# Patient Record
Sex: FEMALE | Race: WHITE | Employment: FULL TIME | URBAN - METROPOLITAN AREA
[De-identification: names, ages, dates, MRNs, and addresses within clinical notes are randomized per-mention and may not be internally consistent; named-entity substitution may affect disease eponyms.]

---

## 2017-10-30 ENCOUNTER — OFFICE VISIT (OUTPATIENT)
Dept: INTERNAL MEDICINE CLINIC | Age: 32
End: 2017-10-30

## 2017-10-30 VITALS
RESPIRATION RATE: 16 BRPM | TEMPERATURE: 97.8 F | WEIGHT: 131 LBS | SYSTOLIC BLOOD PRESSURE: 114 MMHG | HEART RATE: 62 BPM | OXYGEN SATURATION: 97 % | BODY MASS INDEX: 22.36 KG/M2 | DIASTOLIC BLOOD PRESSURE: 78 MMHG | HEIGHT: 64 IN

## 2017-10-30 DIAGNOSIS — Z00.00 ANNUAL PHYSICAL EXAM: Primary | ICD-10-CM

## 2017-10-30 DIAGNOSIS — D24.9 FIBROADENOMA OF BREAST DETERMINED BY BIOPSY: ICD-10-CM

## 2017-10-30 DIAGNOSIS — N63.13 BREAST LUMP ON RIGHT SIDE AT 8 O'CLOCK POSITION: ICD-10-CM

## 2017-10-30 DIAGNOSIS — H93.8X2 SENSATION OF FULLNESS IN LEFT EAR: ICD-10-CM

## 2017-10-30 NOTE — PROGRESS NOTES
Ms. Pili Badillo is a 27y.o. year old female who had concerns including Well Woman and Ear Pain. HPI:  Chief Complaint   Patient presents with    Well Woman     patient has had pap within the past 2 years, patient complains of lump in breast     Ear Pain     popping of the ear on and off for a month     Seen by fertility specialist Dr Laurie Ocampo- labs done and normal.   Her  is a patient here. Pap done at [facility]. History reviewed. No pertinent past medical history. No current outpatient prescriptions on file. No current facility-administered medications for this visit. Reviewed PmHx, RxHx, FmHx, SocHx, AllgHx and updated and dated in the chart. ROS: Negative except for BOLD  General: fever, chills, fatigue  Respiratory: cough, SOB, wheezing  Cardiovascular:  CP, palpitation, LOO, edema   Gastrointestinal: N/V/D, bleeding  Genito-Urinary: dysuria, hematuria  Musculoskeletal: muscle weakness, pain, swelling    OBJECTIVE:   Visit Vitals    /78 (BP 1 Location: Right arm, BP Patient Position: Sitting)    Pulse 62    Temp 97.8 °F (36.6 °C) (Oral)    Resp 16    Ht 5' 4\" (1.626 m)    Wt 131 lb (59.4 kg)    LMP 10/23/2017 (Approximate)    SpO2 97%    BMI 22.49 kg/m2     GEN: The patient appears well, alert, oriented x 3, in no distress. ENT: bilateral TM and canal normal.  Neck supple. No adenopathy or thyromegaly. NICOLAS. Lungs: clear bilaterally, good air entry, no wheezes, rhonchi or rales. Cardiovascular: regular rate and rhythm. S1 and S2 normal, no murmurs,  Abdomen: + BS, soft without tenderness, guarding, rebound, mass or organomegaly. Extremities: no edema, normal peripheral pulses. Neurological: normal, gross sensory and motor in tact without focal findings. Breasts: left breast normal without mass, skin or nipple changes or axillary nodes, right abnormal mass palpable . right breast lump about 8 oclock , fibrous, about 2cm        Assessment/ Plan:       ICD-10-CM ICD-9-CM    1. Breast lump on right side at 8 o'clock position N63.13 611.72 [facility] MAMMOGRAM DIAG RIGHT SAME DAY INCL CAD      US BREASTS LIMITED=<3 QUAD   2. 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 Sensation of fullness in left ear H93.8X2 388. 8      Sinus nasal pressure, no OM or OE. Sx care.   h/d    I have discussed the diagnosis with the patient and the intended plan as seen in the above orders. The patient has received an after-visit summary and questions were answered concerning future plans. Medication Side Effects and Warnings were discussed with patient. Follow-up Disposition:  Return for review mammogram and ultrasound.       Ilana De La Garza MD

## 2017-10-30 NOTE — PROGRESS NOTES
Chief Complaint   Patient presents with    Well Woman     patient has had pap within the past 2 years, patient complains of lump in breast     Ear Pain     popping of the ear on and off for a month     1. Have you been to the ER, urgent care clinic since your last visit? Hospitalized since your last visit? No    2. Have you seen or consulted any other health care providers outside of the [address] [place] [address] since your last visit? Include any pap smears or colon screening.  No

## 2017-10-30 NOTE — MR AVS SNAPSHOT
Visit Information Date & Time Provider Department Dept. Phone Encounter #  
 10/30/2017  9:15 AM MD [facility] [address] [place] [address] [phone] [id number] Upcoming Health Maintenance Date Due  
 PAP AKA CERVICAL CYTOLOGY 8/10/2004 DTaP/Tdap/Td series (2 - Td) 10/30/2027 Allergies as of 10/30/2017  Review Complete On: 10/30/2017 By: Tory Scott Not on File Current Immunizations  Never Reviewed No immunizations on file. Not reviewed this visit You Were Diagnosed With   
  
 Codes Comments Breast lump on right side at 8 o'clock position    -  Primary ICD-10-CM: N63.13 ICD-9-CM: 611.72 Fibroadenoma of breast determined by biopsy     ICD-10-CM: D24.9 ICD-9-CM: 840 Vitals BP Pulse Temp Resp Height(growth percentile) Weight(growth percentile) 114/78 (BP 1 Location: Right arm, BP Patient Position: Sitting) 62 97.8 °F (36.6 °C) (Oral) 16 5' 4\" (1.626 m) 131 lb (59.4 kg) LMP SpO2 BMI OB Status Smoking Status 10/23/2017 (Approximate) 97% 22.49 kg/m2 Having regular periods Current Some Day Smoker BMI and BSA Data Body Mass Index Body Surface Area  
 22.49 kg/m 2 1.64 m 2 Preferred Pharmacy Pharmacy Name Phone [facility] [address] [place] [address] [phone] Your Updated Medication List  
  
Notice  As of 10/30/2017 10:12 AM  
 You have not been prescribed any medications. To-Do List   
 10/30/2017 Imaging:  [facility] MAMMOGRAM DIAG RIGHT SAME DAY INCL CAD Introducing [facility] & HEALTH SERVICES! Lavern Quezada introduces [employer] patient portal. Now you can access parts of your medical record, email your doctor's office, and request medication refills online. 1. In your internet browser, go to [email or web address] 2. Click on the First Time User? Click Here link in the Sign In box. You will see the New Member Sign Up page. 3. Enter your [employer] Access Code exactly as it appears below. You will not need to use this code after youve completed the sign-up process. If you do not sign up before the expiration date, you must request a new code. · [employer] Access Code: [email or web address] Expires: 1/28/2018 10:12 AM 
 
4. Enter the last four digits of your Social Security Number (xxxx) and Date of Birth (mm/dd/yyyy) as indicated and click Submit. You will be taken to the next sign-up page. 5. Create a [employer] ID. This will be your [employer] login ID and cannot be changed, so think of one that is secure and easy to remember. 6. Create a [employer] password. You can change your password at any time. 7. Enter your Password Reset Question and Answer. This can be used at a later time if you forget your password. 8. Enter your e-mail address. You will receive e-mail notification when new information is available in [address] 9. Click Sign Up. You can now view and download portions of your medical record. 10. Click the Download Summary menu link to download a portable copy of your medical information. If you have questions, please visit the Frequently Asked Questions section of the [employer] website. Remember, [employer] is NOT to be used for urgent needs. For medical emergencies, dial 911. Now available from your iPhone and Android! Please provide this summary of care documentation to your next provider. If you have any questions after today's visit, please call [phone].

## 2017-12-18 ENCOUNTER — HOSPITAL ENCOUNTER (OUTPATIENT)
Dept: MAMMOGRAPHY | Age: 32
Discharge: HOME | End: 2017-12-18
Attending: OBSTETRICS & GYNECOLOGY

## 2017-12-18 DIAGNOSIS — N63.10 LUMP OF BREAST, RIGHT: ICD-10-CM

## 2018-08-30 ENCOUNTER — OFFICE VISIT (OUTPATIENT)
Dept: INTERNAL MEDICINE CLINIC | Age: 33
End: 2018-08-30

## 2018-08-30 VITALS
BODY MASS INDEX: 21.89 KG/M2 | OXYGEN SATURATION: 97 % | HEART RATE: 64 BPM | WEIGHT: 128.2 LBS | TEMPERATURE: 98.7 F | RESPIRATION RATE: 16 BRPM | SYSTOLIC BLOOD PRESSURE: 114 MMHG | DIASTOLIC BLOOD PRESSURE: 78 MMHG | HEIGHT: 64 IN

## 2018-08-30 DIAGNOSIS — B00.1 COLD SORE: ICD-10-CM

## 2018-08-30 DIAGNOSIS — K12.0 APHTHOUS ULCER: Primary | ICD-10-CM

## 2018-08-30 RX ORDER — TRIAMCINOLONE ACETONIDE 1 MG/G
PASTE DENTAL 2 TIMES DAILY
Qty: 5 G | Refills: 0 | Status: SHIPPED | OUTPATIENT
Start: 2018-08-30 | End: 2019-02-11

## 2018-08-30 NOTE — PROGRESS NOTES
No chief complaint on file. she is a 26y.o. year old female who presents for evaluation of sores in mouth for 1 week. Patient states that she has no history of cold sores. No known medical problems or similar issues. Denies any fever nausea vomiting ingestion or problems with swallowing. States that she has 2 sores in her mouth and is concerned that this may spread to more Reviewed and agree with Nurse Note and duplicated in this note. Reviewed PmHx, RxHx, FmHx, SocHx, AllgHx and updated and dated in the chart. Family History Problem Relation Age of Onset  Huntingtons disease Mother History reviewed. No pertinent past medical history. Social History Social History  Marital status:  Spouse name: N/A  
 Number of children: N/A  
 Years of education: N/A Social History Main Topics  Smoking status: Current Some Day Smoker  Smokeless tobacco: Never Used  Alcohol use Yes Comment: socially  Drug use: Yes Special: Marijuana Comment: socially  Sexual activity: Yes  
  Partners: Male Birth control/ protection: None Other Topics Concern  None Social History Narrative Review of Systems - negative except as listed above Objective:  
 
Vitals:  
 08/30/18 1440 BP: 114/78 Pulse: 64 Resp: 16 Temp: 98.7 °F (37.1 °C) TempSrc: Oral  
SpO2: 97% Weight: 128 lb 3.2 oz (58.2 kg) Height: 5' 4\" (1.626 m) Physical Examination: General appearance - alert, well appearing, and in no distress Eyes - pupils equal and reactive, extraocular eye movements intact Ears - bilateral TM's and external ear canals normal 
Nose - normal and patent, no erythema, discharge or polyps Mouth - mucous membranes moist, pharynx normal without lesions and oral lesion noted ×1 in the posterior pharynx and one on upper lip Neck - supple, no significant adenopathy Chest - clear to auscultation, no wheezes, rales or rhonchi, symmetric air entry Heart - normal rate, regular rhythm, normal S1, S2, no murmurs, rubs, clicks or gallops Extremities - peripheral pulses normal, no pedal edema, no clubbing or cyanosis Skin - normal coloration and turgor, no rashes, no suspicious skin lesions noted Assessment/ Plan:  
Diagnoses and all orders for this visit: 1. Aphthous ulcer 2. Cold sore 
-     HSV 1 AND 2-SPEC AB, IGG W/RFX TO SUPPL HSV-2 TESTING Other orders 
-     triamcinolone acetonide (KENALOG) 0.1 % dental paste; by Dental route two (2) times a day. Follow-up Disposition: Not on File Patient was informed/counseled on: Body mass index is 22.01 kg/(m^2). 1) Remember to stay active and/or exercise regularly (I suggest 30-45 minutes daily) 2) For reliable dietary information, go to www. EATRIGHT.org. You may wish to consider seeing the nutritionist at [facility] [phone], also consider the [id number] [place] [address]. 3) I routinely suggest a complete physical exam once each year (your birth month) I have discussed the diagnosis with the patient and the intended plan as seen in the above orders. The patient has received an after-visit summary and questions were answered concerning future plans. Medication Side Effects and Warnings were discussed with patient: yes Patient Labs were reviewed and or requested: yes Patient Past Records were reviewed and or requested  yes I have discussed the diagnosis with the patient and the intended plan as seen in the above orders. Pt agrees to call or return to clinic and/or go to closest ER with any worsening of symptoms. This may include, but not limited to increased fever (>100.4) with NSAIDS or Tylenol, increased edema, confusion, rash, worsening of presenting symptoms.

## 2018-08-30 NOTE — PROGRESS NOTES
No chief complaint on file. 26 y.o. female with sore throat, myalgias, swollen glands, headache and fever for 7 days. No history of rheumatic fever. Other symptoms: sore throat, swollen glands and white spots in throat. Fever -no Exudative pharyngitis-no Lymphadenopathy-yes Absence of cough-no Reviewed and agree with Nurse Note and duplicated in this note. Reviewed PmHx, RxHx, FmHx, SocHx, AllgHx and updated and dated in the chart. Family History Problem Relation Age of Onset  Huntingtons disease Mother History reviewed. No pertinent past medical history. Social History Social History  Marital status:  Spouse name: N/A  
 Number of children: N/A  
 Years of education: N/A Social History Main Topics  Smoking status: Current Some Day Smoker  Smokeless tobacco: Never Used  Alcohol use Yes Comment: socially  Drug use: Yes Special: Marijuana Comment: socially  Sexual activity: Yes  
  Partners: Male Birth control/ protection: None Other Topics Concern  None Social History Narrative Review of Systems - negative except as listed above 
{ros master:917510} Objective:  
 
Vitals:  
 08/30/18 1440 Weight: 128 lb 3.2 oz (58.2 kg) Height: 5' 4\" (1.626 m) Physical Examination: {male adult master:459016} Assessment/ Plan:  
{There are no diagnoses linked to this encounter. (Refresh or delete this SmartLink)} Follow-up Disposition: Not on File Discussed the patient's {MU BMI REASON:687588075} BMI with her. The BMI follow up plan is as follows: {Document your plan here:64508} 1) Remember to stay active and/or exercise regularly (I suggest 30-45 minutes daily) 2) For reliable dietary information, go to www. EATRIGHT.org. You may wish to consider seeing the nutritionist at [facility] [phone], also consider the [address] [facility]. 3) I routinely suggest a complete physical exam once each year (your birth month) I have discussed the diagnosis with the patient and the intended plan as seen in the above orders. The patient has received an after-visit summary and questions were answered concerning future plans. Medication Side Effects and Warnings were discussed with patient: {yes/ no default yes:19425::\"yes\"} Patient Labs were reviewed and or requested: {yes/ no default yes:49823::\"yes\"} Patient Past Records were reviewed and or requested  {yes/ no default yes:67374::\"yes\"} I have discussed the diagnosis with the patient and the intended plan as seen in the above orders. Pt agrees to call or return to clinic and/or go to closest ER with any worsening of symptoms. This may include, but not limited to increased fever (>100.4) with NSAIDS or Tylenol, increased edema, confusion, rash, worsening of presenting symptoms.

## 2018-08-30 NOTE — MR AVS SNAPSHOT
[address] [facility]. [place] [address] [address] 
[phone] Patient: aLmonte Leblanc MRN: [id number] PLZ:[id number] Visit Information Date & Time Provider Department Dept. Phone Encounter #  
 8/30/2018  2:45 PM Veronica Bell MD [facility] [phone] [id number] Follow-up Instructions Return if symptoms worsen or fail to improve. Follow-up and Disposition History Upcoming Health Maintenance Date Due Influenza Age 5 to Adult 11/30/2018* Pneumococcal 19-64 Medium Risk (1 of 1 - PPSV23) 8/30/2019* PAP AKA CERVICAL CYTOLOGY 4/3/2021 DTaP/Tdap/Td series (2 - Td) 10/30/2027 *Topic was postponed. The date shown is not the original due date. Allergies as of 8/30/2018  Review Complete On: 8/30/2018 By: Alisha Mendoza Not on File Current Immunizations  Never Reviewed No immunizations on file. Not reviewed this visit You Were Diagnosed With   
  
 Codes Comments Aphthous ulcer    -  Primary ICD-10-CM: K12.0 ICD-9-CM: 528.2 Cold sore     ICD-10-CM: B00.1 ICD-9-CM: 054.9 Vitals BP Pulse Temp Resp Height(growth percentile) Weight(growth percentile) 114/78 (BP 1 Location: Right arm, BP Patient Position: Sitting) 64 98.7 °F (37.1 °C) (Oral) 16 5' 4\" (1.626 m) 128 lb 3.2 oz (58.2 kg) LMP SpO2 BMI OB Status Smoking Status 08/22/2018 (Approximate) 97% 22.01 kg/m2 Having regular periods Current Some Day Smoker Vitals History BMI and BSA Data Body Mass Index Body Surface Area 22.01 kg/m 2 1.62 m 2 [facility] [address] [phone] Your Updated Medication List  
  
   
This list is accurate as of 8/30/18  3:21 PM.  Always use your most recent med list.  
  
  
  
  
 triamcinolone acetonide 0.1 % dental paste Commonly known as:  KENALOG by Dental route two (2) times a day. Prescriptions Sent to Pharmacy Refills  
 triamcinolone acetonide (KENALOG) 0.1 % dental paste 0 Sig: by Dental route two (2) times a day. Class: Normal  
 Pharmacy: [address] [place] [address], [facility]  #: [phone] Route: Dental  
  
We Performed the Following HSV 1 AND 2-SPEC AB, IGG W/RFX TO SUPPL HSV-2 TESTING [[id number] Custom] Follow-up Instructions Return if symptoms worsen or fail to improve. Introducing [facility] & HEALTH SERVICES! [facility] introduces [employer] patient portal. Now you can access parts of your medical record, email your doctor's office, and request medication refills online. 1. In your internet browser, go to [email or web address] 2. Click on the First Time User? Click Here link in the Sign In box. You will see the New Member Sign Up page. 3. Enter your [employer] Access Code exactly as it appears below. You will not need to use this code after youve completed the sign-up process. If you do not sign up before the expiration date, you must request a new code. · [employer] Access Code: [email or web address] Expires: 11/28/2018  3:21 PM 
 
4. Enter the last four digits of your Social Security Number (xxxx) and Date of Birth (mm/dd/yyyy) as indicated and click Submit. You will be taken to the next sign-up page. 5. Create a [employer] ID. This will be your [employer] login ID and cannot be changed, so think of one that is secure and easy to remember. 6. Create a [employer] password. You can change your password at any time. 7. Enter your Password Reset Question and Answer. This can be used at a later time if you forget your password. 8. Enter your e-mail address. You will receive e-mail notification when new information is available in [address] 9. Click Sign Up. You can now view and download portions of your medical record. 10. Click the Download Summary menu link to download a portable copy of your medical information. If you have questions, please visit the Frequently Asked Questions section of the [employer] website. Remember, [employer] is NOT to be used for urgent needs. For medical emergencies, dial 911. Now available from your iPhone and Android! Please provide this summary of care documentation to your next provider. Your primary care clinician is listed as Kerry Carr. If you have any questions after today's visit, please call [phone].

## 2018-08-31 LAB
HSV1 IGG SER IA-ACNC: <0.91 INDEX (ref 0–0.9)
HSV2 IGG SER IA-ACNC: <0.91 INDEX (ref 0–0.9)

## 2018-08-31 NOTE — PROGRESS NOTES
Good news, you blood work came back negative for cold sores. Looks like an aphthous ulcer, continue with the paste that was prescribed.

## 2019-01-03 ENCOUNTER — HOSPITAL ENCOUNTER (OUTPATIENT)
Dept: MAMMOGRAPHY | Age: 34
Discharge: HOME | End: 2019-01-03
Attending: OBSTETRICS & GYNECOLOGY
Payer: COMMERCIAL

## 2019-01-03 DIAGNOSIS — N63.10 LUMP OF RIGHT BREAST: ICD-10-CM

## 2019-01-03 PROCEDURE — 77066 DX MAMMO INCL CAD BI: CPT

## 2019-01-03 PROCEDURE — 76642 ULTRASOUND BREAST LIMITED: CPT

## 2019-01-03 NOTE — PROGRESS NOTES
I left a vm for Dr. Fidel Weiss Ards nurse and let her know that we recommended an aspiration, possible biopsy of right breast. I asked for her to call us back and requested an order.

## 2019-01-07 ENCOUNTER — HOSPITAL ENCOUNTER (OUTPATIENT)
Dept: MAMMOGRAPHY | Age: 34
Discharge: HOME | End: 2019-01-07
Attending: OBSTETRICS & GYNECOLOGY
Payer: COMMERCIAL

## 2019-01-07 DIAGNOSIS — R92.8 ABNORMAL MAMMOGRAM OF RIGHT BREAST: ICD-10-CM

## 2019-01-07 PROCEDURE — 19083 BX BREAST 1ST LESION US IMAG: CPT

## 2019-01-07 PROCEDURE — 74011000250 HC RX REV CODE- 250: Performed by: RADIOLOGY

## 2019-01-07 PROCEDURE — 74011250636 HC RX REV CODE- 250/636: Performed by: RADIOLOGY

## 2019-01-07 RX ORDER — LIDOCAINE HYDROCHLORIDE 10 MG/ML
5 INJECTION INFILTRATION; PERINEURAL
Status: COMPLETED | OUTPATIENT
Start: 2019-01-07 | End: 2019-01-07

## 2019-01-07 RX ORDER — LIDOCAINE HYDROCHLORIDE AND EPINEPHRINE 10; 10 MG/ML; UG/ML
20 INJECTION, SOLUTION INFILTRATION; PERINEURAL ONCE
Status: COMPLETED | OUTPATIENT
Start: 2019-01-07 | End: 2019-01-07

## 2019-01-07 RX ADMIN — LIDOCAINE HYDROCHLORIDE AND EPINEPHRINE 200 MG: 10; 10 INJECTION, SOLUTION INFILTRATION; PERINEURAL at 08:10

## 2019-01-07 RX ADMIN — LIDOCAINE HYDROCHLORIDE 5 ML: 10 INJECTION, SOLUTION INFILTRATION; PERINEURAL at 08:10

## 2019-01-07 NOTE — PROGRESS NOTES
Patient tolerated right breast biopsy (with partial cyst aspiration) well with minimal bleeding. Biopsy site was bandaged in the usual fashion and discharge instructions were reviewed with the patient. She was provided with a written copy as well. Advised patient that results should be available by Wednesday and that she will receive a phone call in the afternoon. Encouraged her to call with any questions or concerns.

## 2019-01-07 NOTE — DISCHARGE INSTRUCTIONS
Breast Biopsy Discharge Instructions    PAIN CONTROL     You may have mild discomfort; take 1-2 Tylenol every 4-6 hours as needed.  Do not take aspirin containing products or anti-inflammatory medications (Advil, Aleve, Motrin, Ibuprofen, etc.) for 24 hours.  Wearing a comfortable bra for support may help with discomfort. WOUND CARE      A small amount of bleeding or bruising at the biopsy site is normal. Watch for signs and symptoms of infections: skin warm to touch, yellowish drainage from wound, fever or severe pain.  Place an ice pack over the site hourly, 20-30 at a time for a few hours today.  The clear dressing is water resistant (you may shower, but do not allow the dressing to become saturated). You may remove the dressing in 48 hours. The steri-strips (small pieces of tape covering the incision) will fall off on their own in a few days. If the clear dressing irritates your skin or begins to peel off, you may remove it. Remember, if you remove the clear dressing before 48 hours, you should not get the site wet.  If at any point you have EXCESSIVE BLEEDING (saturating the gauze under the clear dressing) OR pain please call:    Daytime 7:30am-5:00pm: [facility] [phone]    After Hours:    [phone] (ask to speak to a radiologist)              or [address] [place] [address] [phone]    ACTIVITY     Do not participate in any strenuous activities for 24 hours (exercise, sports, housework, etc.).  You may resume work (light duty only) for the first 24 hours.  No heavy lifting with the arm on the affected side of your body. ADDITIONAL INSTRUCTIONS    We will call you with results on Wednesday January 9 in the afternoon.        YOUR TREATMENT TEAM TODAY WAS    MD: Ziggy Leon  RN: Lawson Choi  Radiology Tech: Luann Escobar

## 2019-01-09 NOTE — PROGRESS NOTES
Pathology approved by Dr. Paco Mcfarlane. Called patient and relayed benign results. Advised patient that she should begin annual mammograms beginning at age 34. Advised her that if she should find another area of concern she should call us for an appointment. She reports that her biopsy site is healing well with no concerns. Encouraged her to call with any questions or concerns.

## 2019-02-11 ENCOUNTER — OFFICE VISIT (OUTPATIENT)
Dept: SURGERY | Age: 34
End: 2019-02-11

## 2019-02-11 VITALS
BODY MASS INDEX: 24.07 KG/M2 | DIASTOLIC BLOOD PRESSURE: 84 MMHG | SYSTOLIC BLOOD PRESSURE: 132 MMHG | HEIGHT: 64 IN | HEART RATE: 71 BPM | WEIGHT: 141 LBS

## 2019-02-11 DIAGNOSIS — N60.01 BREAST CYST, RIGHT: Primary | ICD-10-CM

## 2019-02-11 RX ORDER — AMOXICILLIN AND CLAVULANATE POTASSIUM 875; 125 MG/1; MG/1
1 TABLET, FILM COATED ORAL 2 TIMES DAILY
Qty: 20 TAB | Refills: 0 | Status: SHIPPED | OUTPATIENT
Start: 2019-02-11 | End: 2019-02-21

## 2019-02-11 NOTE — PATIENT INSTRUCTIONS

## 2019-02-11 NOTE — PROGRESS NOTES
HISTORY OF PRESENT ILLNESS  Azul Hernandez is a 26 y.o. female. HPI   NEW patient here today referred for consultation at the request of Dr. Cristopher Staples for swelling, redness and pain at biopsy site. She palpated lump in LEFT breast, which led to mammogram and biopsy. Biopsy was performed on 1/7/19. She has been having no problems at site until this past weekend. Over the weekend, she developed swelling, redness, and pain at biopsy site. Pain has improved and rates pain at 2/10 at this time. She is still able to palpate original lump. FINAL PATHOLOGIC DIAGNOSIS on 1/7/19-    Breast, right, biopsy:   Features consistent with benign cyst.   Apocrine metaplasia. No FH of breast or ovarian cancer. Recent imaging-   Bilateral diagnostic mammogram on 1/3/19- BIRADS 4a  FINDINGS: Bilateral digital diagnostic mammography was performed, and is  interpreted in conjunction with a computer assisted detection (CAD) system. Spot  compression views were performed of the region of palpable concern, the middle  third of the right breast at 8:00. There is an oval obscured equal density mass  in the region of palpable concern. There are no suspicious findings within the  remainder of either breast. Bilateral benign calcifications are noted. There is  no skin thickening or nipple retraction.     Right breast ultrasound was performed of the region of palpable concern, the  8:00 location, 4 cm from the nipple. There is a 3 cm oval circumscribed  homogeneously hypoechoic mass with no internal vascularity.      IMPRESSION:     1. BI-RADS Assessment Category 4a: Suspicious abnormality - Biopsy should be  performed in the absence of clinical contraindication. Palpable 3 cm right  breast mass, representing either a solid mass (such as fibroadenoma) or cyst  with internal echoes. Ultrasound-guided cyst aspiration, followed by core needle  biopsy is necessary, is recommended.  This will be particularly helpful given  patient's planned pregnancy, when the mass may further increase in size. 2. No mammographic evidence of malignancy within the left breast.    History reviewed. No pertinent past medical history. History reviewed. No pertinent surgical history. Social History     Socioeconomic History    Marital status:      Spouse name: Not on file    Number of children: Not on file    Years of education: Not on file    Highest education level: Not on file   Social Needs    Financial resource strain: Not on file    Food insecurity - worry: Not on file    Food insecurity - inability: Not on file    Transportation needs - medical: Not on file   u[employer] needs - non-medical: Not on file   Occupational History    Not on file   Tobacco Use    Smoking status: Former Smoker     Last attempt to quit: 2016     Years since quitting: 3.1    Smokeless tobacco: Never Used   Substance and Sexual Activity    Alcohol use: Yes     Comment: socially    Drug use: Yes     Types: Marijuana     Comment: socially     Sexual activity: Yes     Partners: Male     Birth control/protection: None   Other Topics Concern    Not on file   Social History Narrative    Not on file     OB History     Obstetric Comments    Menarche 15, LMP current, # of children 0, age of 1st delivery na, Hysterectomy/oophorectomy no/no, Breast bx yes, right 1/7/19, history of breast feeding na, BCP yes, Hormone therapy yes, for IVF            Current Outpatient Medications:     amoxicillin-clavulanate (AUGMENTIN) 875-125 mg per tablet, Take 1 Tab by mouth two (2) times a day for 10 days. , Disp: 20 Tab, Rfl: 0  No Known Allergies    Review of Systems   Constitutional: Negative. HENT: Negative. Eyes: Negative. Respiratory: Negative. Cardiovascular: Negative. Gastrointestinal: Negative. Genitourinary: Negative. Musculoskeletal: Negative. Skin: Negative. Neurological: Negative. Endo/Heme/Allergies: Negative.     Psychiatric/Behavioral: Negative. All other systems reviewed and are negative. Physical Exam   Constitutional: She is oriented to person, place, and time. She appears well-developed and well-nourished. HENT:   Head: Normocephalic. Eyes: EOM are normal.   Neck: Neck supple. Cardiovascular: Intact distal pulses. Pulmonary/Chest: Effort normal. Right breast exhibits mass (2 x 2 cm mass 8:00; area of skin erythema mild over cyst). Right breast exhibits no inverted nipple, no nipple discharge, no skin change and no tenderness. Left breast exhibits no inverted nipple, no mass, no nipple discharge, no skin change and no tenderness. Breasts are symmetrical.       Abdominal: Soft. Musculoskeletal: Normal range of motion. Lymphadenopathy:     She has no cervical adenopathy. She has no axillary adenopathy. Neurological: She is alert and oriented to person, place, and time. Skin: Skin is warm and dry. Psychiatric: She has a normal mood and affect. Nursing note and vitals reviewed. ASSESSMENT and PLAN    ICD-10-CM ICD-9-CM    1. Breast cyst, right N60.01 610.0      - inflamed right breast cyst, start abx   F/u in 2 weeks.

## 2019-02-11 NOTE — PROGRESS NOTES
Patient called today and reported that starting approximately last Thursday (2/7/19) her biopsy site had become tender and had an area of redness approximately 2 inches in diameter. Patient also states that the area is slightly warm to the touch. Discussed patients complaints with Dr. Yumiko Johnson. His recommendation is that she consult with [facility] today if possible. Spoke with Freddy Luque at [facility], states that their office will call patient and arrange a time for her to come in. Called patient and made her aware of the above conversations. Patient was appreciative.

## 2019-04-15 ENCOUNTER — OFFICE VISIT (OUTPATIENT)
Dept: INTERNAL MEDICINE CLINIC | Age: 34
End: 2019-04-15

## 2019-04-15 VITALS
HEIGHT: 64 IN | TEMPERATURE: 98.4 F | RESPIRATION RATE: 16 BRPM | DIASTOLIC BLOOD PRESSURE: 79 MMHG | OXYGEN SATURATION: 97 % | SYSTOLIC BLOOD PRESSURE: 113 MMHG | HEART RATE: 54 BPM | WEIGHT: 141 LBS | BODY MASS INDEX: 24.07 KG/M2

## 2019-04-15 DIAGNOSIS — Z01.419 WELL WOMAN EXAM: Primary | ICD-10-CM

## 2019-04-15 DIAGNOSIS — H91.92 HEARING LOSS OF LEFT EAR, UNSPECIFIED HEARING LOSS TYPE: ICD-10-CM

## 2019-04-15 DIAGNOSIS — R20.0 BILATERAL HAND NUMBNESS: ICD-10-CM

## 2019-04-15 NOTE — PROGRESS NOTES
Chief Complaint Patient presents with  Complete Physical  
 
she is a 26y.o. year old female who presents for CPE Complete Physical Exam Questions: LMP -   yesterday Last Pap (q 3 years, or q5 with HPV) - Jan 2019 Last Mammogram (50-74 biennially)- n/a Hx of abnl Pap - Yes 1. Do you follow a low fat diet?  no 
2. Are you up to date on your Tdap (<10 years)? Yes 
3. Have you ever had a Pneumovax vaccine (>65)? Not applicable RAI87 Not applicable [id number] Not applicable 4. Have you had Zoster vaccine (>60)? Not applicable 5. Have you had the HPV - Gardasil (13- 26)? No 
6. Do you follow an exercise program?  yes 7. Do you smoke?  no 
8. Do you consider yourself overweight?  no 
9. Is there a family history of CAD< age 48? No 
10. Is there a family history of Cancer?  yes 11. Do you know your Cancer risks? Yes 12. Have you had a colonoscopy?  no 
13. Have you been tested for HIV or other STI's? Yes HIV testing today(18-64 y/o)? No 
14. Have had a bone density scan or DEXA done(>65)? Not applicable 15. Have you had an EKG performed in the last five years (>50)? Not applicable Other complaints: hand cramps Reviewed and agree with Nurse Note and duplicated in this note. Reviewed PmHx, RxHx, FmHx, SocHx, AllgHx and updated and dated in the chart. Family History Problem Relation Age of Onset  Huntingtons disease Mother  Hypertension Mother History reviewed. No pertinent past medical history. Social History Socioeconomic History  Marital status:  Spouse name: Not on file  Number of children: Not on file  Years of education: Not on file  Highest education level: Not on file Tobacco Use  Smoking status: Former Smoker Last attempt to quit: 2016 Years since quitting: 3.2  Smokeless tobacco: Never Used Substance and Sexual Activity  Alcohol use: Yes Comment: socially  Drug use: Yes Types: Marijuana Comment: socially  Sexual activity: Yes  
  Partners: Male Birth control/protection: None Review of Systems - negative except as listed above Objective:  
 
Vitals:  
 04/15/19 1626 BP: 113/79 Pulse: (!) 54 Resp: 16 Temp: 98.4 °F (36.9 °C) TempSrc: Oral  
SpO2: 97% Weight: 141 lb (64 kg) Height: 5' 4\" (1.626 m) Physical Examination: General appearance - alert, well appearing, and in no distress Eyes - pupils equal and reactive, extraocular eye movements intact Ears - bilateral TM's and external ear canals normal 
Nose - normal and patent, no erythema, discharge or polyps Mouth - mucous membranes moist, pharynx normal without lesions Neck - supple, no significant adenopathy Chest - clear to auscultation, no wheezes, rales or rhonchi, symmetric air entry Heart - normal rate, regular rhythm, normal S1, S2, no murmurs, rubs, clicks or gallops Abdomen - soft, nontender, nondistended, no masses or organomegaly Neurological - alert, oriented, normal speech, no focal findings or movement disorder noted Musculoskeletal - no joint tenderness, deformity or swelling Extremities - peripheral pulses normal, no pedal edema, no clubbing or cyanosis Skin - normal coloration and turgor, no rashes, no suspicious skin lesions noted Assessment/ Plan:  
Diagnoses and all orders for this visit: 
 
1. Well woman exam 
-     CBC W/O DIFF 
-     LIPID PANEL 
-     METABOLIC PANEL, COMPREHENSIVE 
-     TSH 3RD GENERATION 2. Hearing loss of left ear, unspecified hearing loss type 
-     REFERRAL TO ENT-OTOLARYNGOLOGY 3. Bilateral hand numbness Labs to be drawn: CBC, CMP, Lipid I have reviewed/discussed the above normal BMI with the patient. I have recommended the following interventions: dietary management education, guidance, and counseling .    
 
I have discussed the diagnosis with the patient and the intended plan as seen in the above orders. The patient has received an after-visit summary and questions were answered concerning future plans. Medication Side Effects and Warnings were discussed with patient, Patient Labs were reviewed and or requested, and 
Patient Past Records were reviewed and or requested  yes Pt agrees to call or return to clinic and/or go to closest ER with any worsening of symptoms. This may include, but not limited to increased fever (>100.4) with NSAIDS or Tylenol, increased edema, confusion, rash, worsening of presenting symptoms.

## 2019-04-16 DIAGNOSIS — R79.89 ELEVATED TSH: Primary | ICD-10-CM

## 2019-04-16 LAB
ALBUMIN SERPL-MCNC: 4.4 G/DL (ref 3.5–5.5)
ALBUMIN/GLOB SERPL: 1.8 {RATIO} (ref 1.2–2.2)
ALP SERPL-CCNC: 65 IU/L (ref 39–117)
ALT SERPL-CCNC: 15 IU/L (ref 0–32)
AST SERPL-CCNC: 26 IU/L (ref 0–40)
BILIRUB SERPL-MCNC: 0.3 MG/DL (ref 0–1.2)
BUN SERPL-MCNC: 11 MG/DL (ref 6–20)
BUN/CREAT SERPL: 13 (ref 9–23)
CALCIUM SERPL-MCNC: 9.1 MG/DL (ref 8.7–10.2)
CHLORIDE SERPL-SCNC: 102 MMOL/L (ref 96–106)
CHOLEST SERPL-MCNC: 197 MG/DL (ref 100–199)
CO2 SERPL-SCNC: 24 MMOL/L (ref 20–29)
CREAT SERPL-MCNC: 0.85 MG/DL (ref 0.57–1)
ERYTHROCYTE [DISTWIDTH] IN BLOOD BY AUTOMATED COUNT: 13.8 % (ref 12.3–15.4)
GLOBULIN SER CALC-MCNC: 2.5 G/DL (ref 1.5–4.5)
GLUCOSE SERPL-MCNC: 72 MG/DL (ref 65–99)
HCT VFR BLD AUTO: 40.1 % (ref 34–46.6)
HDLC SERPL-MCNC: 62 MG/DL
HGB BLD-MCNC: 13.4 G/DL (ref 11.1–15.9)
LDLC SERPL CALC-MCNC: 117 MG/DL (ref 0–99)
MCH RBC QN AUTO: 28.9 PG (ref 26.6–33)
MCHC RBC AUTO-ENTMCNC: 33.4 G/DL (ref 31.5–35.7)
MCV RBC AUTO: 87 FL (ref 79–97)
PLATELET # BLD AUTO: 258 X10E3/UL (ref 150–379)
POTASSIUM SERPL-SCNC: 4.2 MMOL/L (ref 3.5–5.2)
PROT SERPL-MCNC: 6.9 G/DL (ref 6–8.5)
RBC # BLD AUTO: 4.63 X10E6/UL (ref 3.77–5.28)
SODIUM SERPL-SCNC: 139 MMOL/L (ref 134–144)
TRIGL SERPL-MCNC: 92 MG/DL (ref 0–149)
TSH SERPL DL<=0.005 MIU/L-ACNC: 173.5 UIU/ML (ref 0.45–4.5)
VLDLC SERPL CALC-MCNC: 18 MG/DL (ref 5–40)
WBC # BLD AUTO: 6.5 X10E3/UL (ref 3.4–10.8)

## 2019-04-16 NOTE — PROGRESS NOTES
Your blood work showed that you are hypothyroid. Please come in at your convenience for recheck of these numbers to make sure they are accurate. I will put the lab work in for you. If this is accurate will need to start thyroid medication called Synthroid. Your \"Bad\" cholesterol (LDL and/or triglycerides) are elevated. Please eat a healthier diet as described below. In particular avoid fried, fatty and junk foods, while increasing fiber (fruits and vegetables). If you cannot increase fiber through diet, you can supplement with metamucil as directed on bottle daily. Also, make sure you are taking 1 to 2 grams of over the counter fish oil. Increase exercise to 5 times per week of cardio lasting at least 30 min's each (biking, walking, elliptical, swimming). Lets recheck the fasting (atleast eight hours) in 6 months. Mediterranean diet: Choose this heart-healthy diet option The Mediterranean diet is a heart-healthy eating plan combining elements of Mediterranean-style cooking. Here's how to adopt the Mediterranean diet. If you're looking for a heart-healthy eating plan, the Mediterranean diet might be right for you. The Mediterranean diet incorporates the basics of healthy eating  plus a splash of flavorful olive oil and perhaps a glass of red wine  among other components characterizing the traditional cooking style of countries bordering the [facility]. Most healthy diets include fruits, vegetables, fish and whole grains, and limit unhealthy fats. While these parts of a healthy diet remain tried-and-true, subtle variations or differences in proportions of certain foods may make a difference in your risk of heart disease. Benefits of the [address] Research has shown that the traditional Mediterranean diet reduces the risk of heart disease.  In fact, a recent analysis of more than 1.5 million healthy adults demonstrated that following a Mediterranean diet was associated with a reduced risk of overall and cardiovascular mortality, a reduced incidence of cancer and cancer mortality, and a reduced incidence of Parkinson's and Alzheimer's diseases. For this reason, most if not all major scientific organizations encourage healthy adults to adapt a style of eating like that of the [address] [facility] for prevention of major chronic diseases. Key components of the Mediterranean diet The Mediterranean diet emphasizes:  
Getting plenty of exercise Eating primarily plant-based foods, such as fruits and vegetables, whole grains, legumes and nuts Replacing butter with healthy fats such as olive oil and canola oil Using herbs and spices instead of salt to flavor foods Limiting red meat to no more than a few times a month Eating fish and poultry at least twice a week Drinking red wine in moderation (optional) The diet also recognizes the importance of enjoying meals with family and friends. Fruits, vegetables, nuts and grains The Mediterranean diet traditionally includes fruits, vegetables, pasta and rice. For example, residents of [facility] eat very little red meat and average nine servings a day of antioxidant-rich fruits and vegetables. The Mediterranean diet has been associated with a lower level of oxidized low-density lipoprotein (LDL) cholesterol  the \"bad\" cholesterol that's more likely to build up deposits in your arteries. Nuts are another part of a healthy Mediterranean diet. Nuts are high in fat (approximately 80 percent of their calories come from fat), but most of the fat is not saturated. Because nuts are high in calories, they should not be eaten in large amounts  generally no more than a handful a day. For the best nutrition, avoid candied or honey-roasted and heavily salted nuts.   
Grains in the [facility] region are typically whole grain and usually contain very few unhealthy trans fats, and bread is an important part of the diet there. However, throughout the [facility] region, bread is eaten plain or dipped in olive oil  not eaten with butter or margarines, which contain saturated or trans fats.

## 2019-04-19 LAB — TSH SERPL DL<=0.005 MIU/L-ACNC: 176.4 UIU/ML (ref 0.45–4.5)

## 2019-04-22 RX ORDER — LEVOTHYROXINE SODIUM 75 UG/1
75 TABLET ORAL
Qty: 30 TAB | Refills: 3 | Status: SHIPPED | OUTPATIENT
Start: 2019-04-22 | End: 2019-04-23 | Stop reason: DRUGHIGH

## 2019-04-22 NOTE — PROGRESS NOTES
It does look like he has hypothyroidism. We will start you on Synthroid and I will send this to your pharmacy.   Please take as directed

## 2019-04-23 DIAGNOSIS — D22.9 ATYPICAL MOLE: Primary | ICD-10-CM

## 2019-04-23 RX ORDER — LEVOTHYROXINE SODIUM 75 UG/1
75 TABLET ORAL
Qty: 30 TAB | Refills: 3 | Status: SHIPPED | OUTPATIENT
Start: 2019-04-23

## 2020-03-13 LAB
ANTIBODY SCREEN, EXTERNAL: POSITIVE
CHLAMYDIA, EXTERNAL: NEGATIVE
HBSAG, EXTERNAL: NEGATIVE
HIV, EXTERNAL: NEGATIVE
N. GONORRHEA, EXTERNAL: NEGATIVE
RPR, EXTERNAL: NON REACTIVE
RUBELLA, EXTERNAL: NORMAL
TYPE, ABO & RH, EXTERNAL: NORMAL

## 2020-09-16 LAB — GRBS, EXTERNAL: POSITIVE

## 2020-10-01 ENCOUNTER — HOSPITAL ENCOUNTER (INPATIENT)
Age: 35
LOS: 3 days | Discharge: HOME | End: 2020-10-04
Attending: OBSTETRICS & GYNECOLOGY | Admitting: OBSTETRICS & GYNECOLOGY
Payer: COMMERCIAL

## 2020-10-01 PROBLEM — R03.0 ELEVATED BP WITHOUT DIAGNOSIS OF HYPERTENSION: Status: ACTIVE | Noted: 2020-10-01

## 2020-10-01 PROBLEM — O13.9 GESTATIONAL HYPERTENSION: Status: ACTIVE | Noted: 2020-10-01

## 2020-10-01 PROBLEM — Z3A.38 38 WEEKS GESTATION OF PREGNANCY: Status: ACTIVE | Noted: 2020-10-01

## 2020-10-01 LAB
ALBUMIN SERPL-MCNC: 2.9 G/DL (ref 3.5–5)
ALBUMIN/GLOB SERPL: 0.8 {RATIO} (ref 1.1–2.2)
ALP SERPL-CCNC: 159 U/L (ref 45–117)
ALT SERPL-CCNC: 18 U/L (ref 12–78)
ANION GAP SERPL CALC-SCNC: 7 MMOL/L (ref 5–15)
AST SERPL-CCNC: 19 U/L (ref 15–37)
BILIRUB SERPL-MCNC: 0.2 MG/DL (ref 0.2–1)
BUN SERPL-MCNC: 8 MG/DL (ref 6–20)
BUN/CREAT SERPL: 14 (ref 12–20)
CALCIUM SERPL-MCNC: 9.4 MG/DL (ref 8.5–10.1)
CHLORIDE SERPL-SCNC: 108 MMOL/L (ref 97–108)
CO2 SERPL-SCNC: 22 MMOL/L (ref 21–32)
COVID-19 RAPID TEST, COVR: NOT DETECTED
CREAT SERPL-MCNC: 0.58 MG/DL (ref 0.55–1.02)
CREAT UR-MCNC: 23.1 MG/DL
ERYTHROCYTE [DISTWIDTH] IN BLOOD BY AUTOMATED COUNT: 14.5 % (ref 11.5–14.5)
GLOBULIN SER CALC-MCNC: 3.6 G/DL (ref 2–4)
GLUCOSE SERPL-MCNC: 78 MG/DL (ref 65–100)
HCT VFR BLD AUTO: 35.9 % (ref 35–47)
HGB BLD-MCNC: 12.3 G/DL (ref 11.5–16)
LDH SERPL L TO P-CCNC: 134 U/L (ref 81–246)
MCH RBC QN AUTO: 29.7 PG (ref 26–34)
MCHC RBC AUTO-ENTMCNC: 34.3 G/DL (ref 30–36.5)
MCV RBC AUTO: 86.7 FL (ref 80–99)
NRBC # BLD: 0 K/UL (ref 0–0.01)
NRBC BLD-RTO: 0 PER 100 WBC
PLATELET # BLD AUTO: 219 K/UL (ref 150–400)
PMV BLD AUTO: 10.6 FL (ref 8.9–12.9)
POTASSIUM SERPL-SCNC: 4.3 MMOL/L (ref 3.5–5.1)
PROT SERPL-MCNC: 6.5 G/DL (ref 6.4–8.2)
PROT UR-MCNC: 10 MG/DL (ref 0–11.9)
PROT/CREAT UR-RTO: 0.4
RBC # BLD AUTO: 4.14 M/UL (ref 3.8–5.2)
SODIUM SERPL-SCNC: 137 MMOL/L (ref 136–145)
SOURCE, COVRS: NORMAL
SPECIMEN SOURCE, FCOV2M: NORMAL
SPECIMEN TYPE, XMCV1T: NORMAL
URATE SERPL-MCNC: 6.1 MG/DL (ref 2.6–6)
WBC # BLD AUTO: 6.3 K/UL (ref 3.6–11)

## 2020-10-01 PROCEDURE — 87635 SARS-COV-2 COVID-19 AMP PRB: CPT

## 2020-10-01 PROCEDURE — 59025 FETAL NON-STRESS TEST: CPT

## 2020-10-01 PROCEDURE — 74011250637 HC RX REV CODE- 250/637: Performed by: OBSTETRICS & GYNECOLOGY

## 2020-10-01 PROCEDURE — 84156 ASSAY OF PROTEIN URINE: CPT

## 2020-10-01 PROCEDURE — 3E033VJ INTRODUCTION OF OTHER HORMONE INTO PERIPHERAL VEIN, PERCUTANEOUS APPROACH: ICD-10-PCS | Performed by: OBSTETRICS & GYNECOLOGY

## 2020-10-01 PROCEDURE — 84550 ASSAY OF BLOOD/URIC ACID: CPT

## 2020-10-01 PROCEDURE — 99285 EMERGENCY DEPT VISIT HI MDM: CPT

## 2020-10-01 PROCEDURE — 85027 COMPLETE CBC AUTOMATED: CPT

## 2020-10-01 PROCEDURE — 83615 LACTATE (LD) (LDH) ENZYME: CPT

## 2020-10-01 PROCEDURE — 65270000029 HC RM PRIVATE

## 2020-10-01 PROCEDURE — 80053 COMPREHEN METABOLIC PANEL: CPT

## 2020-10-01 PROCEDURE — 75410000002 HC LABOR FEE PER 1 HR

## 2020-10-01 PROCEDURE — 36415 COLL VENOUS BLD VENIPUNCTURE: CPT

## 2020-10-01 RX ORDER — GLUCOSAMINE SULFATE 1500 MG
1000 POWDER IN PACKET (EA) ORAL DAILY
COMMUNITY

## 2020-10-01 RX ORDER — BUTORPHANOL TARTRATE 1 MG/ML
1 INJECTION INTRAMUSCULAR; INTRAVENOUS
Status: DISCONTINUED | OUTPATIENT
Start: 2020-10-01 | End: 2020-10-03

## 2020-10-01 RX ORDER — NALOXONE HYDROCHLORIDE 0.4 MG/ML
0.4 INJECTION, SOLUTION INTRAMUSCULAR; INTRAVENOUS; SUBCUTANEOUS AS NEEDED
Status: DISCONTINUED | OUTPATIENT
Start: 2020-10-01 | End: 2020-10-03

## 2020-10-01 RX ORDER — DIPHENHYDRAMINE HYDROCHLORIDE 50 MG/ML
12.5 INJECTION, SOLUTION INTRAMUSCULAR; INTRAVENOUS
Status: DISCONTINUED | OUTPATIENT
Start: 2020-10-01 | End: 2020-10-03

## 2020-10-01 RX ORDER — SODIUM CHLORIDE 0.9 % (FLUSH) 0.9 %
5-40 SYRINGE (ML) INJECTION AS NEEDED
Status: DISCONTINUED | OUTPATIENT
Start: 2020-10-01 | End: 2020-10-03

## 2020-10-01 RX ORDER — SODIUM CHLORIDE, SODIUM LACTATE, POTASSIUM CHLORIDE, CALCIUM CHLORIDE 600; 310; 30; 20 MG/100ML; MG/100ML; MG/100ML; MG/100ML
125 INJECTION, SOLUTION INTRAVENOUS CONTINUOUS
Status: DISCONTINUED | OUTPATIENT
Start: 2020-10-02 | End: 2020-10-03

## 2020-10-01 RX ORDER — LEVOTHYROXINE SODIUM 150 UG/1
150 TABLET ORAL
COMMUNITY

## 2020-10-01 RX ORDER — CHOLECALCIFEROL (VITAMIN D3) 50 MCG
1 CAPSULE ORAL DAILY
COMMUNITY

## 2020-10-01 RX ORDER — MAG HYDROX/ALUMINUM HYD/SIMETH 200-200-20
30 SUSPENSION, ORAL (FINAL DOSE FORM) ORAL
Status: DISCONTINUED | OUTPATIENT
Start: 2020-10-01 | End: 2020-10-03

## 2020-10-01 RX ORDER — BUPIVACAINE HYDROCHLORIDE 2.5 MG/ML
10 INJECTION, SOLUTION EPIDURAL; INFILTRATION; INTRACAUDAL ONCE
Status: ACTIVE | OUTPATIENT
Start: 2020-10-01 | End: 2020-10-02

## 2020-10-01 RX ORDER — ACETAMINOPHEN 325 MG/1
650 TABLET ORAL
Status: DISCONTINUED | OUTPATIENT
Start: 2020-10-01 | End: 2020-10-03

## 2020-10-01 RX ORDER — SODIUM CHLORIDE, SODIUM LACTATE, POTASSIUM CHLORIDE, CALCIUM CHLORIDE 600; 310; 30; 20 MG/100ML; MG/100ML; MG/100ML; MG/100ML
125 INJECTION, SOLUTION INTRAVENOUS CONTINUOUS
Status: DISCONTINUED | OUTPATIENT
Start: 2020-10-01 | End: 2020-10-01

## 2020-10-01 RX ORDER — LEVOTHYROXINE SODIUM 150 UG/1
150 TABLET ORAL
Status: DISCONTINUED | OUTPATIENT
Start: 2020-10-02 | End: 2020-10-04 | Stop reason: HOSPADM

## 2020-10-01 RX ORDER — SODIUM CHLORIDE 0.9 % (FLUSH) 0.9 %
5-40 SYRINGE (ML) INJECTION EVERY 8 HOURS
Status: DISCONTINUED | OUTPATIENT
Start: 2020-10-01 | End: 2020-10-03

## 2020-10-01 RX ORDER — ONDANSETRON 2 MG/ML
4 INJECTION INTRAMUSCULAR; INTRAVENOUS
Status: DISCONTINUED | OUTPATIENT
Start: 2020-10-01 | End: 2020-10-03

## 2020-10-01 RX ORDER — ASCORBIC ACID 250 MG
250 TABLET ORAL DAILY
COMMUNITY

## 2020-10-01 RX ORDER — FENTANYL/BUPIVACAINE/NS/PF 2-1250MCG
1-16 PREFILLED PUMP RESERVOIR EPIDURAL CONTINUOUS
Status: DISCONTINUED | OUTPATIENT
Start: 2020-10-01 | End: 2020-10-03

## 2020-10-01 RX ORDER — ZOLPIDEM TARTRATE 5 MG/1
5 TABLET ORAL
Status: DISCONTINUED | OUTPATIENT
Start: 2020-10-01 | End: 2020-10-03

## 2020-10-01 RX ORDER — FENTANYL CITRATE 50 UG/ML
100 INJECTION, SOLUTION INTRAMUSCULAR; INTRAVENOUS ONCE
Status: ACTIVE | OUTPATIENT
Start: 2020-10-01 | End: 2020-10-02

## 2020-10-01 RX ORDER — EPHEDRINE SULFATE/0.9% NACL/PF 50 MG/5 ML
10 SYRINGE (ML) INTRAVENOUS
Status: DISPENSED | OUTPATIENT
Start: 2020-10-01 | End: 2020-10-02

## 2020-10-01 RX ORDER — DIPHENHYDRAMINE HCL 25 MG
25 CAPSULE ORAL
Status: DISCONTINUED | OUTPATIENT
Start: 2020-10-01 | End: 2020-10-03

## 2020-10-01 RX ADMIN — Medication 10 ML: at 12:13

## 2020-10-01 RX ADMIN — MISOPROSTOL 25 MCG: 100 TABLET ORAL at 15:08

## 2020-10-01 RX ADMIN — Medication 10 ML: at 22:06

## 2020-10-01 RX ADMIN — ZOLPIDEM TARTRATE 5 MG: 5 TABLET ORAL at 22:05

## 2020-10-01 RX ADMIN — MISOPROSTOL 25 MCG: 100 TABLET ORAL at 19:04

## 2020-10-01 NOTE — PROGRESS NOTES
A  admitted to outpatient under the services of Dr. Essie Ruiz seen in the office today with elevated BP's and spotting. Pt states some N&V yesterday AM but has resolved. Denies HA's, visual changes or epigastric pain. Pt states IVF pregnancy, GBS+ and RH neg. Pt states Dr. Essie Ruiz said, admitted for possible induction following test results. 1213:  IV started in L arm per Dylan Jensen RN and saline locked. 1338:  Dr. Essie Ruiz at bedside discussing plan of care with pt, transferred to inpatient. 1346:  Cervical exam per Dr. Essie Ruiz:  /-3.  1348:  Cervical ripening balloon placed per Dr. Essie Ruiz (60ml NS in uterine balloon and 60 ml NS in vaginal balloon). 1400:  Monitors off, pt up to the BR.  1415:  Transferred to  5 ambulatory, pt requesting a room with a tub.  1445:  Lunch tray served. 1717:  Dr. Essie Ruiz at bedside discussing plan of care with pt. Cervical ripening Balloon is to be removed after 12 hours and antibiotics started then. To continue Cytotec until AM.  Provider will decide in the AM further plan of care. 1940:  Rapid Covid test done per Shahnaz Mukherjee RN and sent. Bedside and Verbal shift change report given to ROB Harris RN (oncoming nurse) by FRANCY Holcomb RN (offgoing nurse). Report included the following information SBAR, Kardex, Intake/Output, MAR and Recent Results.

## 2020-10-01 NOTE — PROGRESS NOTES
1940: Bedside shift change report given to JERAMY Harris RN (oncoming nurse) by Jalil Shaw RN (offgoing nurse). Report included the following information SBAR.   1934: Bedside shift change report given to Freddy Ivey RN (oncoming nurse) by Basim Ojeda RN (offgoing nurse). Report included the following information SBAR.

## 2020-10-01 NOTE — PROGRESS NOTES
Came back to check on pt and see . Reviewed plan of care. Has preeclampsia by upcr resulted 0.4. Discussed low threshold for bp medication in labor and possible use of iv magnesium for seizure ppx if preeclampsia worsened. Pt does hope for lamaze and reviewed options for pain management. Plan for cook out at 3 am, continue misoprostol as tolerated. Start ampicillin at sign of labor and/or once balloon is out.  Cat I fht.

## 2020-10-01 NOTE — H&P
History & Physical    Name: Corey Haley MRN: [id number]  SSN: [id number]    YOB: 1985  Age: 38 y.o. Sex: female      Subjective:     Estimated Date of Delivery: 10/12/20  OB History    Para Term  AB Living   1             SAB TAB Ectopic Molar Multiple Live Births                    # Outcome Date GA Lbr Randal/2nd Weight Sex Delivery Anes PTL Lv   1 Current               Obstetric Comments   Menarche 14, LMP current, # of children 0, age of 1st delivery na, Hysterectomy/oophorectomy no/no, Breast bx yes, right 17, history of breast feeding na, BCP yes, Hormone therapy yes, for IVF       Ms. Lyudmila Marquis is admitted with pregnancy at 38w3d for induction of labor due to gestational hypertension. No ha/bv/n/v/abd pain. Prenatal course was normal.  Please see prenatal records for details.     Past Medical History:   Diagnosis Date    Abnormal Papanicolaou smear of cervix     leep    Breast disorder     hx of lump on R side, biopsy benign    Essential hypertension     elevated BP's today    Gestational hypertension     elevated BP's today    Infertility, female     pregnancy IVF    Rhesus isoimmunization affecting pregnancy     Rh negative, Rhogam X 2 during this pregnancy (had some bleeding at the beginning of the pregnancy)    Thyroid activity decreased     on Synthroid, diagnosed      Past Surgical History:   Procedure Laterality Date    HX OTHER SURGICAL      wisdom teeth    HX OTHER SURGICAL      egg retreavals     Social History     Occupational History    Not on file   Tobacco Use    Smoking status: Former Smoker     Last attempt to quit: 2016     Years since quittin.7    Smokeless tobacco: Never Used   Substance and Sexual Activity    Alcohol use: Yes     Comment: socially    Drug use: Yes     Types: Marijuana     Comment: socially     Sexual activity: Yes     Partners: Male     Birth control/protection: None     Family History   Problem Relation Age of Onset  Hypertension Mother     Other Father         hypothyroidism    Asthma Brother     Huntingtons disease Maternal Aunt     Huntingtons disease Maternal Uncle     Huntingtons disease Maternal Grandmother     Parkinson's Disease Paternal Grandmother        Allergies   Allergen Reactions    Latex Rash and Itching     Prior to Admission medications    Medication Sig Start Date End Date Taking? Authorizing Provider   levothyroxine (Synthroid) 150 mcg tablet Take 150 mcg by mouth Daily (before breakfast). Indications: a condition with low thyroid hormone levels   Yes Provider, Historical   PNV Comb #2-Iron-FA-Omega 3 29-1-400 mg cmpk Take 1 Tab by mouth daily. Indications: pregnancy   Yes Provider, Historical   Omega-3 Fatty Acids 60- mg cpDR Take 1 Cap by mouth. Takes 4 caps, total dose per day is 500 mg   Indications: pregnancy   Yes Provider, Historical   cholecalciferol (Vitamin D3) 25 mcg (1,000 unit) cap Take 1,000 Units by mouth daily. Indications: prevention of vitamin D deficiency   Yes Provider, Historical   ascorbic acid, vitamin C, (Vitamin C) 250 mg tablet Take 250 mg by mouth daily. Indications: pregnancy   Yes Provider, Historical   B.infantis-B.ani-B.long-B.bifi (Probiotic 4X) 10-15 mg TbEC Take 1 Cap by mouth daily. Indications: pregnancy   Yes Provider, Historical   levothyroxine (SYNTHROID) 75 mcg tablet Take 1 Tab by mouth Daily (before breakfast). Patient taking differently: Take 150 mcg by mouth Daily (before breakfast). Indications: a condition with low thyroid hormone levels 4/23/19   Bird Barragan MD        Review of Systems: A comprehensive review of systems was negative except for that written in the History of Present Illness.     Objective:     Vitals:  Vitals:    10/01/20 1153 10/01/20 1227 10/01/20 1249 10/01/20 1305   BP: (!) 155/86 (!) 140/92 (!) 141/95 (!) 146/95   Pulse: 74 72 71 71   Resp: 18      Temp: 98.8 °F (37.1 °C)           Physical Exam:  Patient without distress. Heart: Regular rate and rhythm  Lung: clear to auscultation throughout lung fields, no wheezes, no rales, no rhonchi and normal respiratory effort  Abdomen: soft, nontender  Fundus: soft and non tender  Perineum: blood absent, amniotic fluid absent  Cervical Exam: 1/50/-3 vertex; cook catheter inserted without difficulty, balloons 60/60  Lower Extremities:  - Edema No  Membranes:  Intact  Fetal Heart Rate: 130s mod +accels no decels  [place]: irritability  EFW: 7#          Prenatal Labs: Impression/Plan:     Active Problems:    Elevated BP without diagnosis of hypertension (10/1/2020)      Gestational hypertension (10/1/2020)      38 weeks gestation of pregnancy (10/1/2020)       Recent Results (from the past 12 hour(s))   CBC W/O DIFF    Collection Time: 10/01/20 12:23 PM   Result Value Ref Range    WBC 6.3 3.6 - 11.0 K/uL    RBC 4.14 3.80 - 5.20 M/uL    HGB 12.3 11.5 - 16.0 g/dL    HCT 35.9 35.0 - 47.0 %    MCV 86.7 80.0 - 99.0 FL    MCH 29.7 26.0 - 34.0 PG    MCHC 34.3 30.0 - 36.5 g/dL    RDW 14.5 11.5 - 14.5 %    PLATELET 919 405 - 855 K/uL    MPV 10.6 8.9 - 12.9 FL    NRBC 0.0 0  WBC    ABSOLUTE NRBC 0.00 0.00 - 2.88 K/uL   METABOLIC PANEL, COMPREHENSIVE    Collection Time: 10/01/20 12:23 PM   Result Value Ref Range    Sodium 137 136 - 145 mmol/L    Potassium 4.3 3.5 - 5.1 mmol/L    Chloride 108 97 - 108 mmol/L    CO2 22 21 - 32 mmol/L    Anion gap 7 5 - 15 mmol/L    Glucose 78 65 - 100 mg/dL    BUN 8 6 - 20 MG/DL    Creatinine 0.58 0.55 - 1.02 MG/DL    BUN/Creatinine ratio 14 12 - 20      GFR est AA >60 >60 ml/min/1.73m2    GFR est non-AA >60 >60 ml/min/1.73m2    Calcium 9.4 8.5 - 10.1 MG/DL    Bilirubin, total 0.2 0.2 - 1.0 MG/DL    ALT (SGPT) 18 12 - 78 U/L    AST (SGOT) 19 15 - 37 U/L    Alk.  phosphatase 159 (H) 45 - 117 U/L    Protein, total 6.5 6.4 - 8.2 g/dL    Albumin 2.9 (L) 3.5 - 5.0 g/dL    Globulin 3.6 2.0 - 4.0 g/dL    A-G Ratio 0.8 (L) 1.1 - 2.2     URIC ACID    Collection Time: 10/01/20 12:23 PM   Result Value Ref Range    Uric acid 6.1 (H) 2.6 - 6.0 MG/DL   LD    Collection Time: 10/01/20 12:23 PM   Result Value Ref Range     81 - 246 U/L         Plan: Admit for induction of labor. Group B Strep positive, will treat prophylactically with ampicillin. IOL with cook catheter/misoprostol buccal.  Labs normal; upcr pending. Category I FHT.

## 2020-10-02 ENCOUNTER — ANESTHESIA (OUTPATIENT)
Dept: LABOR AND DELIVERY | Age: 35
End: 2020-10-02
Payer: COMMERCIAL

## 2020-10-02 ENCOUNTER — ANESTHESIA EVENT (OUTPATIENT)
Dept: LABOR AND DELIVERY | Age: 35
End: 2020-10-02
Payer: COMMERCIAL

## 2020-10-02 PROCEDURE — 10907ZC DRAINAGE OF AMNIOTIC FLUID, THERAPEUTIC FROM PRODUCTS OF CONCEPTION, VIA NATURAL OR ARTIFICIAL OPENING: ICD-10-PCS | Performed by: OBSTETRICS & GYNECOLOGY

## 2020-10-02 PROCEDURE — 74011000250 HC RX REV CODE- 250: Performed by: ANESTHESIOLOGY

## 2020-10-02 PROCEDURE — 75410000002 HC LABOR FEE PER 1 HR

## 2020-10-02 PROCEDURE — 0KQM0ZZ REPAIR PERINEUM MUSCLE, OPEN APPROACH: ICD-10-PCS | Performed by: OBSTETRICS & GYNECOLOGY

## 2020-10-02 PROCEDURE — 77030019905 HC CATH URETH INTMIT MDII -A

## 2020-10-02 PROCEDURE — 74011250636 HC RX REV CODE- 250/636: Performed by: ANESTHESIOLOGY

## 2020-10-02 PROCEDURE — 75410000003 HC RECOV DEL/VAG/CSECN EA 0.5 HR

## 2020-10-02 PROCEDURE — 74011250636 HC RX REV CODE- 250/636: Performed by: OBSTETRICS & GYNECOLOGY

## 2020-10-02 PROCEDURE — 75410000000 HC DELIVERY VAGINAL/SINGLE

## 2020-10-02 PROCEDURE — 74011000258 HC RX REV CODE- 258: Performed by: OBSTETRICS & GYNECOLOGY

## 2020-10-02 PROCEDURE — 65270000029 HC RM PRIVATE

## 2020-10-02 PROCEDURE — 74011250637 HC RX REV CODE- 250/637: Performed by: OBSTETRICS & GYNECOLOGY

## 2020-10-02 PROCEDURE — 0HQ9XZZ REPAIR PERINEUM SKIN, EXTERNAL APPROACH: ICD-10-PCS | Performed by: OBSTETRICS & GYNECOLOGY

## 2020-10-02 PROCEDURE — 77030014125 HC TY EPDRL BBMI -B: Performed by: ANESTHESIOLOGY

## 2020-10-02 PROCEDURE — 76060000078 HC EPIDURAL ANESTHESIA

## 2020-10-02 RX ORDER — FENTANYL CITRATE 50 UG/ML
INJECTION, SOLUTION INTRAMUSCULAR; INTRAVENOUS AS NEEDED
Status: DISCONTINUED | OUTPATIENT
Start: 2020-10-02 | End: 2020-10-02 | Stop reason: HOSPADM

## 2020-10-02 RX ORDER — FENTANYL CITRATE 50 UG/ML
100 INJECTION, SOLUTION INTRAMUSCULAR; INTRAVENOUS ONCE
Status: DISCONTINUED | OUTPATIENT
Start: 2020-10-02 | End: 2020-10-03

## 2020-10-02 RX ORDER — BUPIVACAINE HYDROCHLORIDE 2.5 MG/ML
30 INJECTION, SOLUTION EPIDURAL; INFILTRATION; INTRACAUDAL ONCE
Status: DISCONTINUED | OUTPATIENT
Start: 2020-10-02 | End: 2020-10-03

## 2020-10-02 RX ORDER — LIDOCAINE HYDROCHLORIDE AND EPINEPHRINE 15; 5 MG/ML; UG/ML
INJECTION, SOLUTION EPIDURAL AS NEEDED
Status: DISCONTINUED | OUTPATIENT
Start: 2020-10-02 | End: 2020-10-02 | Stop reason: HOSPADM

## 2020-10-02 RX ORDER — BUPIVACAINE HYDROCHLORIDE 2.5 MG/ML
INJECTION, SOLUTION EPIDURAL; INFILTRATION; INTRACAUDAL AS NEEDED
Status: DISCONTINUED | OUTPATIENT
Start: 2020-10-02 | End: 2020-10-02 | Stop reason: HOSPADM

## 2020-10-02 RX ORDER — BUPIVACAINE HYDROCHLORIDE 2.5 MG/ML
INJECTION, SOLUTION EPIDURAL; INFILTRATION; INTRACAUDAL
Status: COMPLETED
Start: 2020-10-02 | End: 2020-10-02

## 2020-10-02 RX ORDER — OXYTOCIN/RINGER'S LACTATE 30/500 ML
0-20 PLASTIC BAG, INJECTION (ML) INTRAVENOUS
Status: DISCONTINUED | OUTPATIENT
Start: 2020-10-02 | End: 2020-10-03

## 2020-10-02 RX ADMIN — BUPIVACAINE HYDROCHLORIDE 3 ML: 2.5 INJECTION, SOLUTION EPIDURAL; INFILTRATION; INTRACAUDAL; PERINEURAL at 14:53

## 2020-10-02 RX ADMIN — MISOPROSTOL 25 MCG: 100 TABLET ORAL at 05:17

## 2020-10-02 RX ADMIN — AMPICILLIN SODIUM 1 G: 1 INJECTION, POWDER, FOR SOLUTION INTRAMUSCULAR; INTRAVENOUS at 07:18

## 2020-10-02 RX ADMIN — Medication 10 ML/HR: at 15:01

## 2020-10-02 RX ADMIN — LIDOCAINE HYDROCHLORIDE,EPINEPHRINE BITARTRATE 5 ML: 15; .005 INJECTION, SOLUTION EPIDURAL; INFILTRATION; INTRACAUDAL; PERINEURAL at 14:49

## 2020-10-02 RX ADMIN — LEVOTHYROXINE SODIUM 150 MCG: 0.15 TABLET ORAL at 07:40

## 2020-10-02 RX ADMIN — AMPICILLIN SODIUM 1 G: 1 INJECTION, POWDER, FOR SOLUTION INTRAMUSCULAR; INTRAVENOUS at 14:30

## 2020-10-02 RX ADMIN — BUPIVACAINE HYDROCHLORIDE 3 ML: 2.5 INJECTION, SOLUTION EPIDURAL; INFILTRATION; INTRACAUDAL; PERINEURAL at 14:55

## 2020-10-02 RX ADMIN — BUPIVACAINE HYDROCHLORIDE 3 ML: 2.5 INJECTION, SOLUTION EPIDURAL; INFILTRATION; INTRACAUDAL; PERINEURAL at 14:54

## 2020-10-02 RX ADMIN — OXYTOCIN 1 MILLI-UNITS/MIN: 10 INJECTION INTRAVENOUS at 09:50

## 2020-10-02 RX ADMIN — OXYTOCIN 6 MILLI-UNITS/MIN: 10 INJECTION INTRAVENOUS at 19:32

## 2020-10-02 RX ADMIN — AMPICILLIN SODIUM 2 G: 2 INJECTION, POWDER, FOR SOLUTION INTRAMUSCULAR; INTRAVENOUS at 03:04

## 2020-10-02 RX ADMIN — FENTANYL CITRATE 100 MCG: 50 INJECTION, SOLUTION INTRAMUSCULAR; INTRAVENOUS at 14:51

## 2020-10-02 RX ADMIN — AMPICILLIN SODIUM 1 G: 1 INJECTION, POWDER, FOR SOLUTION INTRAMUSCULAR; INTRAVENOUS at 18:34

## 2020-10-02 RX ADMIN — MISOPROSTOL 25 MCG: 100 TABLET ORAL at 00:19

## 2020-10-02 RX ADMIN — AMPICILLIN SODIUM 1 G: 1 INJECTION, POWDER, FOR SOLUTION INTRAMUSCULAR; INTRAVENOUS at 09:53

## 2020-10-02 NOTE — PROGRESS NOTES
Labor Progress Note  Patient seen, fetal heart rate and contraction pattern evaluated, patient examined. Feeling contractions but not super painful  Patient Vitals for the past 1 hrs:   BP Pulse   10/02/20 1201 (!) 140/90 76       Physical Exam:  Cervical Exam: /-  Membranes:  Intact    FHR: 140 moderate variability, present accels, no decels  [place]: q 2-3min    Assessment/Plan:  37 yo  @ 38w4d with IOL for pree without severe features based on elevated urine P/C and mild range BPs. IOL:  --S/p cook and miso. Currently on pitocin of 8, cervix unchanged so AROM for clear fluid at this check.   --GBS positive, continue ampicillin\  --FHR cat 1  Pree w/o severe features:  --Continue to monitor BPs.      Janusz Allen MD   10/2/2020  12:20 PM

## 2020-10-02 NOTE — PROGRESS NOTES
0730 - Bedside shift change report given to Danielle Kumar RN (oncoming nurse) by Alma Rosa Kolb RN (offgoing nurse). Report included the following information SBAR, Kardex, and Intake/Output. Patient here for induction due to Elevated BP.     0755 - Patient up to bathroom, small clot noted when voided and some bloody show. [address] - Dr Jay Mo at bedside to check patient. Informed Dr Jay Mo of small clot and bloody show. SVE 5/50/-2. Discussed plan of care to allow patient to shower, ok to walk around, eat breakfast and then start pitocin around 9. Readjusted monitors and plan to get a consistent tracing on baby before taking her off monitor. 36 - RN adjusted FHR monitor, patient not feeling the contractions     0848 - Patient off monitor, up to shower    7367 - Patient ambulating in hallway, RN adjusting monitors    0930 - Patient in room on birthing ball, RN at bedside adjusting monitors    0940 - RN remained at bedside adjusting monitors, patient feeling contractions but not super intense    0950 - Start pitocin    1215 - Dr Jay Mo at bedside, SVE 5/50/-2 and AROM clear fluid, moderate amount    1415 - Patient quite uncomfortable, requesting epidural. IV bolus initiated. [address] at bedside to place epidural. Patient sitting up for epidural.    1455 - Patient on left side    1510 - Patient on right side, peanut ball placed    1530 - Bedside shift change report given to WILL Mcelroy RN (oncoming nurse) by Danielle Kumar RN (offgoing nurse). Report included the following information SBAR, Kardex, Recent Results and Quality Measures.

## 2020-10-02 NOTE — PROGRESS NOTES
Labor Progress Note  Patient seen, fetal heart rate and contraction pattern evaluated, patient examined. Pt having some more bloody show. Denies any HA, changes in vision, RUQ pain  Got last dose of miso around 0500, cook out around 0300  Patient Vitals for the past 1 hrs:   BP Pulse   10/02/20 0719 119/61 68       Physical Exam:  Cervical Exam: 5/50/-2  Membranes:  Intact    FHR: 135 moderate variability, present accels, no decels  Marine: q 4-6 mins    Assessment/Plan:  37 yo  @ 38w4d with IOL for pree without severe features based on elevated urine P/C and mild range BPs. IOL:  --S/p cook and miso. Will eat breakfast and shower then start pitocin. Discussed AROM at lunch  --GBS positive, continue ampicillin  Pree w/o severe features:  --Continue to monitor BPs.      Catherine Desai MD   10/2/2020  8:13 AM

## 2020-10-02 NOTE — PROGRESS NOTES
In to see pt for decels. Pt in hands and knees position with O2 in place. IVF bolus going. Pitocin off    Cervix now /0  FHR:+scalp stim and now cat 1 after cervical check    Decels likely secondary to quick progression. OB hospitalist updated.  Anticipate     Diane Orta MD   10/2/2020  5:16 PM

## 2020-10-02 NOTE — ANESTHESIA PROCEDURE NOTES
Epidural Block    Performed by: Jessica Salter DO  Authorized by: Jessica Salter DO     Pre-Procedure  Indication: labor epidural    Preanesthetic Checklist: risks and benefits discussed and timeout performed      Epidural:   Patient position:  Seated  Prep region:  Lumbar  Prep: Chlorhexidine    Location:  L3-4    Needle and Epidural Catheter:   Needle Type:  Tuohy  Needle Gauge:  17 G  Injection Technique:  Loss of resistance using air  Attempts:  3  Catheter Size:  19 G  Events: no blood with aspiration, no cerebrospinal fluid with aspiration, no paresthesia and negative aspiration test    Test Dose:  Bupivacaine 0.25% and negative    Assessment:   Catheter Secured:  Tegaderm and tape  Insertion:  Uncomplicated  Patient tolerance:  Patient tolerated the procedure well with no immediate complications

## 2020-10-02 NOTE — PROGRESS NOTES
[facility] Labor Progress Note     Patient: Luann Villatoro MRN: [id number]  SSN: [id number]    YOB: 1985  Age: 38 y.o. Sex: female      Care assumed at     Subjective:   Patient coping well with contractions.  is at bedside providing support. Objective:   Blood pressure 131/84, pulse 78, temperature 98.4 °F (36.9 °C), resp. rate 16, not currently breastfeeding. Patient Vitals for the past 4 hrs:    Mode Fetal Heart Rate Fetal Activity Variability Decelerations Accelerations RN Reviewed Strip?   10/01/20 2030 External 135  6-25 BPM None Yes Yes   10/01/20 2000 External 130  6-25 BPM None Yes Yes   10/01/20 1930 External 135 Present 6-25 BPM None Yes Yes   10/01/20 1915 External 135     Yes   10/01/20 1900 External 130 Present 6-25 BPM None Yes Yes   10/01/20 1845 External 130     Yes   10/01/20 1830 External 130 Present 6-25 BPM Variable Yes Yes   10/01/20 1815 External 135     Yes   10/01/20 1800 External 135 Present 6-25 BPM None Yes Yes   10/01/20 1745 External 130     Yes   10/01/20 1730 External 135 Present 6-25 BPM None Yes Yes   10/01/20 1715 External 130     Yes     Uterine contractions q 2-3 minutes, mild to palpation, resting tone soft    Sterile Vaginal Exam: deferred, fetal presentation vertex, membranes intact     Cook in place    Patient Vitals for the past 4 hrs:   Temp Pulse Resp BP   10/01/20 1959 98.4 °F (36.9 °C) 78 16 131/84   10/01/20 1902  72 18 138/84   10/01/20 1811 98.7 °F (37.1 °C) 71 18 (!) 159/94   10/01/20 1712  68 18 (!) 152/94       Assessment:     38w3d  Category 1 fetal heart rate tracing   IOL for Pre E without severe features    Plan:   Continue current orders/management as outlined by Dr. Abdi Lopez to rest overnight    Wendie Ruby CNM

## 2020-10-02 NOTE — PROGRESS NOTES
1540: Bedside and Verbal shift change report given to MANNY Mcelroy RN (oncoming nurse) by Rayray Ramey RN (offgoing nurse). Report included the following information SBAR, Intake/Output, MAR and Recent Results. 1620: Dr. Soumya Hernandez at bedside; SVE 6/80/-1; IU placed. 1709: Dr. Soumya Hernandez at bedside; SVE 9/100/0.    1910: SVE 10/100/+1.    1915: Notified Dr. Su Parson that pt is SVE 10/100/+1. Orders to labor down. 1930: Bedside and Verbal shift change report given to CATE Morrell RN (oncoming nurse) by Rodri Mcelroy RN (offgoing nurse). Report included the following information SBAR, Intake/Output, MAR and Recent Results.

## 2020-10-02 NOTE — ANESTHESIA PREPROCEDURE EVALUATION
Relevant Problems   No relevant active problems       Anesthetic History   No history of anesthetic complications            Review of Systems / Medical History  Patient summary reviewed, nursing notes reviewed and pertinent labs reviewed    Pulmonary  Within defined limits                 Neuro/Psych             Comments: Smoking Status: Former Smoker   Quit Smokin16    Cardiovascular    Hypertension              Exercise tolerance: >4 METS     GI/Hepatic/Renal  Within defined limits              Endo/Other      Hypothyroidism       Other Findings              Physical Exam    Airway  Mallampati: III  TM Distance: > 6 cm  Neck ROM: normal range of motion   Mouth opening: Normal     Cardiovascular  Regular rate and rhythm,  S1 and S2 normal,  no murmur, click, rub, or gallop  Rhythm: regular  Rate: normal         Dental  No notable dental hx       Pulmonary  Breath sounds clear to auscultation               Abdominal  GI exam deferred       Other Findings            Anesthetic Plan    ASA: 2  Anesthesia type: epidural            Anesthetic plan and risks discussed with: Patient and Spouse

## 2020-10-03 PROCEDURE — 85461 HEMOGLOBIN FETAL: CPT

## 2020-10-03 PROCEDURE — 86900 BLOOD TYPING SEROLOGIC ABO: CPT

## 2020-10-03 PROCEDURE — 65410000002 HC RM PRIVATE OB

## 2020-10-03 PROCEDURE — 74011250636 HC RX REV CODE- 250/636: Performed by: ADVANCED PRACTICE MIDWIFE

## 2020-10-03 PROCEDURE — 74011250637 HC RX REV CODE- 250/637: Performed by: ADVANCED PRACTICE MIDWIFE

## 2020-10-03 PROCEDURE — 36415 COLL VENOUS BLD VENIPUNCTURE: CPT

## 2020-10-03 RX ORDER — SWAB
1 SWAB, NON-MEDICATED MISCELLANEOUS DAILY
Status: DISCONTINUED | OUTPATIENT
Start: 2020-10-03 | End: 2020-10-04 | Stop reason: HOSPADM

## 2020-10-03 RX ORDER — ACETAMINOPHEN 325 MG/1
650 TABLET ORAL
Status: DISCONTINUED | OUTPATIENT
Start: 2020-10-03 | End: 2020-10-04 | Stop reason: HOSPADM

## 2020-10-03 RX ORDER — DOCUSATE SODIUM 100 MG/1
100 CAPSULE, LIQUID FILLED ORAL
Status: DISCONTINUED | OUTPATIENT
Start: 2020-10-03 | End: 2020-10-04 | Stop reason: HOSPADM

## 2020-10-03 RX ORDER — OXYCODONE AND ACETAMINOPHEN 5; 325 MG/1; MG/1
1 TABLET ORAL
Status: DISCONTINUED | OUTPATIENT
Start: 2020-10-03 | End: 2020-10-04 | Stop reason: HOSPADM

## 2020-10-03 RX ORDER — HYDROCORTISONE ACETATE PRAMOXINE HCL 2.5; 1 G/100G; G/100G
CREAM TOPICAL AS NEEDED
Status: DISCONTINUED | OUTPATIENT
Start: 2020-10-03 | End: 2020-10-04 | Stop reason: HOSPADM

## 2020-10-03 RX ORDER — NALOXONE HYDROCHLORIDE 0.4 MG/ML
0.4 INJECTION, SOLUTION INTRAMUSCULAR; INTRAVENOUS; SUBCUTANEOUS AS NEEDED
Status: DISCONTINUED | OUTPATIENT
Start: 2020-10-03 | End: 2020-10-04 | Stop reason: HOSPADM

## 2020-10-03 RX ORDER — IBUPROFEN 600 MG/1
600 TABLET ORAL
Status: DISCONTINUED | OUTPATIENT
Start: 2020-10-03 | End: 2020-10-04 | Stop reason: HOSPADM

## 2020-10-03 RX ADMIN — Medication 1 TABLET: at 14:54

## 2020-10-03 RX ADMIN — HUMAN RHO(D) IMMUNE GLOBULIN 0.3 MG: 300 INJECTION, SOLUTION INTRAMUSCULAR at 22:43

## 2020-10-03 RX ADMIN — IBUPROFEN 600 MG: 600 TABLET, FILM COATED ORAL at 00:58

## 2020-10-03 RX ADMIN — IBUPROFEN 600 MG: 600 TABLET, FILM COATED ORAL at 07:24

## 2020-10-03 RX ADMIN — DOCUSATE SODIUM 100 MG: 100 CAPSULE, LIQUID FILLED ORAL at 21:41

## 2020-10-03 RX ADMIN — LEVOTHYROXINE SODIUM 150 MCG: 0.15 TABLET ORAL at 07:24

## 2020-10-03 RX ADMIN — IBUPROFEN 600 MG: 600 TABLET, FILM COATED ORAL at 14:54

## 2020-10-03 RX ADMIN — IBUPROFEN 600 MG: 600 TABLET, FILM COATED ORAL at 21:38

## 2020-10-03 NOTE — L&D DELIVERY NOTE
Delivery Summary    Patient: Luann Villatoro MRN: [id number]  SSN: [id number]    YOB: 1985  Age: 38 y.o. Sex: female       Information for the patient's :  Jeannette Sweet [[id number]]       Labor Events:    Labor: No    Steroids: None   Cervical Ripening Date/Time: 10/1/2020 2:00 PM   Cervical Ripening Type: Ibarra/EASI;Cervidil   Antibiotics During Labor: Yes   Rupture Identifier:      Rupture Date/Time: 10/2/2020 12:18 PM   Rupture Type: AROM   Amniotic Fluid Volume:  Moderate    Amniotic Fluid Description: Clear    Amniotic Fluid Odor: None    Induction: Oxytocin       Induction Date/Time: 10/2/2020 9:50 AM    Indications for Induction: Gestational Hypertension    Augmentation: None   Augmentation Date/Time:      Indications for Augmentation:     Labor complications: None       Additional complications:        Delivery Events:  Indications For Episiotomy:     Episiotomy:     Perineal Laceration(s): 2nd   Repaired: Yes   Periurethral Laceration Location:      Repaired:     Labial Laceration Location: bilateral   Repaired: Yes   Sulcal Laceration Location:     Repaired:     Vaginal Laceration Location:     Repaired:     Cervical Laceration Location:     Repaired:     Repair Suture: Vicryl 3-0   Number of Repair Packets: 3   Estimated Blood Loss (ml):  ml   Quantitative Blood Loss (ml)                Delivery Date: 10/2/2020    Delivery Time: 9:49 PM  Delivery Type: Vaginal, Spontaneous  Sex:  Male    Gestational Age: 38w3d   Delivery Clinician:  Maxim Partida  Living Status: Living   Delivery Location: L&D            APGARS  One minute Five minutes Ten minutes   Skin color: 0   1        Heart rate: 2   2        Grimace: 2   2        Muscle tone: 2   2        Breathin   2        Totals: 8   9            Presentation: Vertex    Position:   Occiput Anterior  Resuscitation Method:  None     Meconium Stained: None      Cord Information: 3 Vessels  Complications: None  Cord around:    Delayed cord clamping? Yes  Cord clamped date/time:10/2/2020  9:52 PM  Disposition of Cord Blood: Lab    Blood Gases Sent?: No    Placenta:  Date/Time: 10/2/2020  9:55 PM  Removal: Spontaneous      Appearance: Normal      Measurements:  Birth Weight: 2.945 kg      Birth Length: 50.8 cm      Head Circumference: 33.5 cm      Chest Circumference:       Abdominal Girth: 30 cm    Other Providers:   ;Corey DAVIES;;;;;;;Elbert LOCKWOOD, Obstetrician;Primary Nurse;Primary [place] Nurse;Nicu Nurse;Neonatologist;Anesthesiologist;Crna;Nurse Practitioner;Midwife;Nursery Nurse           Group B Strep:   Lab Results   Component Value Date/Time    GrBStrep, External positive 2020     Information for the patient's :  Desean Espinal [[id number]]     Lab Results   Component Value Date/Time    ABO/Rh(D) O POSITIVE 10/02/2020 10:08 PM    REYES IgG NEG 10/02/2020 10:08 PM    Bilirubin if REYES pos: IF DIRECT HUMZA POSITIVE, BILIRUBIN TO FOLLOW 10/02/2020 10:08 PM      No results for input(s): PCO2CB, PO2CB, HCO3I, SO2I, IBD, PTEMPI, SPECTI, PHICB, ISITE, IDEV, IALLEN in the last 72 hours. Called to patient's room for delivery. Patient was c/c/2 at 2100 and started pushing at that time. She pushed for approximately 50 minutes to [facility] at 2149. No nuchal noted. Shoulders and body delivered with ease, infant lifted to maternal abdomen, towel dried, vigorous cry. Cord double clamped by me and then cut by FOB after pulsations ceased. Apgars 8 at one minute and 9 at five minutes. Placenta spontaneous at 2155. Examined and was found to be intact. 3 vessel cord. Fundus firm to massage. Pitocin IV per protocol. QBL per flowsheet. Vulva, vagina and perineum inspected and found to have 2nd degree perineal laceration and bilateral labial lacerations that were repaired with 3-0 Vicryl CT under Epidural anesthesia to good approximation.  Skin very friable and difficult to achieve hemostasis d/t friability. Surgicel placed and now hemostatic. Mom and baby doing well. Baby remains skin to skin with mom.

## 2020-10-03 NOTE — PROGRESS NOTES
@2330 Bedside shift change report given to JW Espana RN (oncoming nurse) by Maria Antonia Shannon. Leyla Rivas RN (offgoing nurse). Report included the following information SBAR, Kardex, Intake/Output, MAR and Recent Results. @0040 TRANSFER - OUT REPORT:    Verbal report given to S. Juana Jonathan Oliveros RN(name) on Boom Mood  being transferred to MIU(unit) for routine progression of care       Report consisted of patients Situation, Background, Assessment and   Recommendations(SBAR). Information from the following report(s) SBAR, Procedure Summary, Intake/Output, MAR and Recent Results was reviewed with the receiving nurse. Lines:   Peripheral IV 10/01/20 Left Arm (Active)   Site Assessment Clean, dry, & intact 10/02/20 1600   Phlebitis Assessment 0 10/02/20 1600   Infiltration Assessment 0 10/02/20 1600   Dressing Status Clean, dry, & intact 10/02/20 1600   Dressing Type Transparent 10/02/20 1600   Hub Color/Line Status Pink 10/02/20 1600        Opportunity for questions and clarification was provided.       Patient transported with:   Registered Nurse

## 2020-10-03 NOTE — ROUTINE PROCESS
TRANSFER - IN REPORT: 
 
0100- Verbal report received from Hussein Miller RN(name) on Micky Force  being received from L&D(unit) for routine progression of care Report consisted of patients Situation, Background, Assessment and  
Recommendations(SBAR). Information from the following report(s) SBAR was reviewed with the receiving nurse. Opportunity for questions and clarification was provided. Assessment completed upon patients arrival to unit and care assumed.

## 2020-10-03 NOTE — PROGRESS NOTES
Bedside shift change report given to Corry Corona RN (oncoming nurse) by AIDEN Arias RN (offgoing nurse). Report included the following information SBAR.

## 2020-10-03 NOTE — PROGRESS NOTES
Labor Progress Note    S: Patient seen, fetal heart rate and contraction pattern evaluated. Comfortable with epidural. Just started pushing with RN at bedside.       Physical Exam:  Patient Vitals for the past 4 hrs:   Temp Pulse Resp BP SpO2   10/02/20 1938     99 %   10/02/20 1935 98.5 °F (36.9 °C) 90 16 133/74    10/02/20 1902 98.6 °F (37 °C) (!) 111 14 (!) 142/65 98 %   10/02/20 1802 98.4 °F (36.9 °C) 87 16 113/73 97 %         Cervical Exam: c/c/2  Membranes:  Continues to leak clear fluid  Uterine Contractions:  Frequency: q2-4 minutes, strong  Fetal Heart Rate: 135s, +scalp stim with RN while pushing, early and variable decels, moderate variability      Assessment/Plan:    38 y.o.  at 38w4d IUP  2nd stage labor  Cat 2 tracing  GBS positive    P:  Continue pushing  Anticipate     Thermon REDD Sood

## 2020-10-03 NOTE — PROGRESS NOTES
Post-Partum Day Number 1 Progress Note    Patient doing well post-partum without significant complaint. Voiding without difficulty, normal lochia. Denies HA, changes in vision, RUQ pain    Vitals:    Patient Vitals for the past 8 hrs:   BP Temp Pulse Resp   10/03/20 0220 130/76 98.4 °F (36.9 °C) 84 14   10/03/20 0100 (!) 142/76 98.1 °F (36.7 °C) (!) 111 14     Temp (24hrs), Av.3 °F (36.8 °C), Min:97.9 °F (36.6 °C), Max:98.6 °F (37 °C)      Vital signs stable, afebrile. Exam:  Patient without distress. Abdomen soft, fundus firm at level of umbilicus, nontender                    Lower extremities are negative for swelling, cords or tenderness. Lab/Data Review: All lab results for the last 24 hours reviewed. Assessment and Plan:  Patient appears to be having uncomplicated post-partum course. Continue routine perineal care and maternal education. Plan discharge tomorrow if no problems occur. Pree without severe features: BPs normal to mild range s/p delivery. Asymptomatic, will continue to monitor. Baby boy wants circ, consent signed and lido ordered.  Will do tomorrow so can work on breastfeeding today         Rh neg, baby Rh positive, Rhogam ordered  Rimm      Hypothyroidism, home synthroid ordered    Johan Lyons MD   10/3/2020  8:50 AM

## 2020-10-03 NOTE — LACTATION NOTE
This note was copied from a baby's chart. Initial Lactation Consultation:  Mom is 44yo  delivered yesterday at 2149  gestation 38 4/7 weeks. 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 Mom had breast changes and leaking colostrum. Discussed with parents: positioning and alternating positions, using pillows to support nursing couplet, characteristics deep v shallow latch, and feeding cues. Demonstrated breast massage and hand expression with drops of colostrum easily expressed. Infant fed 3/4tsp EBM via spoon. Techniques to wake infant taught, but infant remains sleepy at breast. Mom has short nipples and  Infant tends to bite and tongue thrust. Parents taught jaw massage and tongue training exercises with return demonstration by dad. Infant has more coordinated suck and tongue extension after intervention. Discussed use, care, and weaning of shield to be used to keep infant from pushing tongue in front of nipple. Few deep sucks noted and infant then sleepy. Skin to skin with dad. [place] behaviors reviewed, Very sleepy in first 24 hours, mother must wake baby for feedings, offer hand expressed drops, baby usually will respond and feed vigorously 6-8 times in the first day, but is important to offer 8-12 times,  After baby wakes from deep sleep usually on the 2nd or 3rd day a new behavior pattern follows. Frequent feeding during this brief behavioral phase preceeding milk transition is called cluster feeding. Typical  behavior: baby becomes vigorous at the breast and wants to feed frequently- every 1-2 hours for several feedings. This is the normal process by which the baby demands his/her supply.   This type of frequent feeding is the stimulation which causes lactogenesis II (milk coming in). Feeding Plan: Mother will keep baby skin to skin as often as possible, feed on demand, 8-12x/day , respond to feeding cues, obtain latch with shield as needed, listen for audible swallowing, be aware of signs of oxytocin release/ cramping,thirst,sleepiness while breastfeeding, offer both breasts,and will not limit feedings. Mother agrees to utilize breast massage while nursing to facilitate lactogenesis.

## 2020-10-04 VITALS
HEART RATE: 98 BPM | OXYGEN SATURATION: 97 % | RESPIRATION RATE: 16 BRPM | SYSTOLIC BLOOD PRESSURE: 132 MMHG | TEMPERATURE: 98.3 F | WEIGHT: 183 LBS | DIASTOLIC BLOOD PRESSURE: 81 MMHG | BODY MASS INDEX: 31.41 KG/M2

## 2020-10-04 LAB
ABO + RH BLD: NORMAL
BLD PROD TYP BPU: NORMAL
BPU ID: NORMAL
FETAL SCREEN,FMHS: NORMAL
STATUS OF UNIT,%ST: NORMAL
UNIT DIVISION, %UDIV: 0
WEAK D AG RBC QL: NORMAL

## 2020-10-04 PROCEDURE — 74011250637 HC RX REV CODE- 250/637: Performed by: ADVANCED PRACTICE MIDWIFE

## 2020-10-04 RX ORDER — IBUPROFEN 600 MG/1
600 TABLET ORAL
Qty: 30 TAB | Refills: 1 | Status: SHIPPED | OUTPATIENT
Start: 2020-10-04

## 2020-10-04 RX ADMIN — LEVOTHYROXINE SODIUM 150 MCG: 0.15 TABLET ORAL at 05:22

## 2020-10-04 RX ADMIN — IBUPROFEN 600 MG: 600 TABLET, FILM COATED ORAL at 04:44

## 2020-10-04 NOTE — DISCHARGE SUMMARY
Obstetrical Discharge Summary     Name: Apolinar Alvarado MRN: [id number]  SSN: [id number]    YOB: 1985  Age: 38 y.o. Sex: female      Allergies: Latex    Admit Date: 10/1/2020    Discharge Date: 10/4/2020     Admitting Physician: Amandeep Trinh MD     Attending Physician:  Maurice Martinez MD     * Admission Diagnoses: Elevated BP without diagnosis of hypertension [R03.0]  Gestational hypertension [O13.9]  38 weeks gestation of pregnancy [Z3A.38]    * Discharge Diagnoses:   Information for the patient's :  [place] [[id number]]   Delivery of a 2.945 kg male infant via Vaginal, Spontaneous on 10/2/2020 at 9:49 PM  by Chapin Bland. Apgars were 8  and 9 .      Pre without severe features    Additional Diagnoses:   Hospital Problems as of 10/4/2020 Date Reviewed: 4/15/2019          Codes Class Noted - Resolved POA    Elevated BP without diagnosis of hypertension ICD-10-CM: R03.0  ICD-9-CM: 796.2  10/1/2020 - Present Unknown        Gestational hypertension ICD-10-CM: O13.9  ICD-9-CM: 642.30  10/1/2020 - Present Unknown        38 weeks gestation of pregnancy ICD-10-CM: Z3A.38  ICD-9-CM: V22.2  10/1/2020 - Present Unknown             Lab Results   Component Value Date/Time    ABO/Rh(D) O NEGATIVE 10/03/2020 05:04 AM    Rubella, External Immune 2020    GrBStrep, External positive 2020    ABO,Rh O negative 2020      Immunization History   Administered Date(s) Administered    Rho(D) Immune Globulin - IM 10/03/2020       * Procedures: *    [place]  Depression Scale  I have been able to laugh and see the funny side of things: As much as I always could  I have looked forward with enjoyment to things: As much as I ever did  I have blamed myself unnecessarily when things went wrong: Not very often  I have been anxious or worried for no good reason: No, not at all  I have felt scared or panicky for no very good reason: No, not at all  Things have been getting on top of me: No, I have been coping as well as ever  I have been so unhappy that I have had difficulty sleeping: No, not at all  I have felt sad or miserable: No, not at all  I have been so unhappy that I have been crying: No, never  The thought of harming myself has occurred to me: Never  Total Score: 1    * Discharge Condition: good and stable    * Hospital Course: Normal hospital course following the delivery. Pts BP were normal to mild range s/p delivery and she was asymptomatic. * Disposition: Home    Discharge Medications:   Current Discharge Medication List      START taking these medications    Details   ibuprofen (MOTRIN) 600 mg tablet Take 1 Tab by mouth every six (6) hours as needed for Pain. Qty: 30 Tab, Refills: 1         CONTINUE these medications which have NOT CHANGED    Details   !! levothyroxine (Synthroid) 150 mcg tablet Take 150 mcg by mouth Daily (before breakfast). Indications: a condition with low thyroid hormone levels      PNV Comb #2-Iron-FA-Omega 3 29-1-400 mg cmpk Take 1 Tab by mouth daily. Indications: pregnancy      Omega-3 Fatty Acids 60- mg cpDR Take 1 Cap by mouth. Takes 4 caps, total dose per day is 500 mg   Indications: pregnancy      cholecalciferol (Vitamin D3) 25 mcg (1,000 unit) cap Take 1,000 Units by mouth daily. Indications: prevention of vitamin D deficiency      ascorbic acid, vitamin C, (Vitamin C) 250 mg tablet Take 250 mg by mouth daily. Indications: pregnancy      B.infantis-B.ani-B.long-B.bifi (Probiotic 4X) 10-15 mg TbEC Take 1 Cap by mouth daily. Indications: pregnancy      !! levothyroxine (SYNTHROID) 75 mcg tablet Take 1 Tab by mouth Daily (before breakfast). Qty: 30 Tab, Refills: 3       !! - Potential duplicate medications found. Please discuss with provider. * Follow-up Care/Patient Instructions:   Activity: Activity as tolerated and No sex for 6 weeks, nothing in vagina  Diet: Regular Diet  Wound Care: None needed    Follow-up Information Follow up With Specialties Details Why Contact Info    Lavinia Chowdhury MD Family Medicine, Sports Medicine   [address]  [phone]      Joe Cooley MD Gynecology, Obstetrics In 2 weeks  [address] [place] [address]  [phone]                 Laura Meeks MD   10/4/2020  9:29 AM

## 2020-10-04 NOTE — DISCHARGE INSTRUCTIONS
POSTPARTUM DISCHARGE INSTRUCTIONS       Name:  Michael Duke  YOB: 1985  Admission Diagnosis:  Elevated BP without diagnosis of hypertension [R03.0]  Gestational hypertension [O13.9]  38 weeks gestation of pregnancy [Z3A.38]     Discharge Diagnosis:    Problem List as of 10/4/2020 Date Reviewed: 4/15/2019          Codes Class Noted - Resolved    Elevated BP without diagnosis of hypertension ICD-10-CM: R03.0  ICD-9-CM: 796.2  10/1/2020 - Present        Gestational hypertension ICD-10-CM: O13.9  ICD-9-CM: 642.30  10/1/2020 - Present        38 weeks gestation of pregnancy ICD-10-CM: Z3A.38  ICD-9-CM: V22.2  10/1/2020 - Present        Breast lump on right side at 8 o'clock position ICD-10-CM: N63.13  ICD-9-CM: 611.72  10/30/2017 - Present        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 It is easy to get too tired and overwhelmed during the first weeks after your baby is born. Changes in your hormones can shift your mood without warning. You may find it hard to meet the extra demands on your energy and time. Take it easy on yourself. Follow-up care is a key part of your treatment and safety. Be sure to make and go to all appointments, and call your doctor if you are having problems. It's also a good idea to know your test results and keep a list of the medicines you take. How can you care for yourself at home? Vaginal Bleeding and Cramps  · After delivery, you will have a bloody discharge from the vagina. This will turn pink within a week and then white or yellow after about 10 days. It may last for 2 to 4 weeks or longer, until the uterus has healed. Use pads instead of tampons until you stop bleeding.   · Do not worry if you pass some blood clots, as long as they are smaller than a golf ball. If you have a tear or stitches in your vaginal area, change the pad at least every 4 hours to prevent soreness and infection. · You may have cramps for the first few days after childbirth. These are normal and occur as the uterus shrinks to normal size. Take an over-the-counter pain medicine, such as acetaminophen (Tylenol), ibuprofen (Advil, Motrin), or naproxen (Aleve), for cramps. Read and follow all instructions on the label. Do not take aspirin, because it can cause more bleeding. Do not take acetaminophen (Tylenol) and other acetaminophen containing medications (i.e. Percocet) at the same time. Episiotomy, Lacerations or Tears  · If you have stitches, they will dissolve on their own and do not need to be removed. · Put ice or a cold pack on your painful area for 10 to 20 minutes at a time, several times a day, for the first few days. Put a thin cloth between the ice and your skin. · Sit in a few inches of warm water (sitz bath) 3 times a day and after bowel movements. The warm water helps with pain and itching. If you do not have a tub, a warm shower might help. Breast fullness  · Your breasts may overfill (engorge) in the first few days after delivery. To help milk flow and to relieve pain, warm your breasts in the shower or by using warm, moist towels before nursing. · If you are not nursing, do not put warmth on your breasts or touch your breasts. Wear a tight bra or sports bra and use ice until the fullness goes away. This usually takes 2 to 3 days. · Put ice or a cold pack on your breast after nursing to reduce swelling and pain. Put a thin cloth between the ice and your skin. Activity  · Eat a balanced diet. Do not try to lose weight by cutting calories. Keep taking your prenatal vitamins, or take a multivitamin. · Get as much rest as you can. Try to take naps when your baby sleeps during the day.   · Get some exercise every day. But do not do any heavy exercise until your doctor says it is okay. · Wait until you are healed (about 4 to 6 weeks) before you have sexual intercourse. Your doctor will tell you when it is okay to have sex. · Talk to your doctor about birth control. You can get pregnant even before your period returns. Also, you can get pregnant while you are breast-feeding. Mental Health  · Many women get the \"baby blues\" during the first few days after childbirth. You may lose sleep, feel irritable, and cry easily. You may feel happy one minute and sad the next. Hormone changes are one cause of these emotional changes. Also, the demands of a new baby, along with visits from relatives or other family needs, add to a mother's stress. The \"baby blues\" often peak around the fourth day. Then they ease up in less than 2 weeks. · If your moodiness or anxiety lasts for more than 2 weeks, or if you feel like life is not worth living, you may have postpartum depression. This is different for each mother. Some mothers with serious depression may worry intensely about their infant's well-being. Others may feel distant from their child. Some mothers might even feel that they might harm their baby. A mother may have signs of paranoia, wondering if someone is watching her. · With all the changes in your life, you may not know if you are depressed. Pregnancy sometimes causes changes in how you feel that are similar to the symptoms of depression. · Symptoms of depression include:  · Feeling sad or hopeless and losing interest in daily activities. These are the most common symptoms of depression. · Sleeping too much or not enough. · Feeling tired. You may feel as if you have no energy. · Eating too much or too little. · [employer] ([employer]I) offers a Warm line; Chat with the Expert phone sessions; Information and Articles about Pregnancy and Postpartum Mood Disorders;  Comprehensive List of Free Support Groups; Knowledgeable local coordinators who will offer support, information, and resources; Guide to Resources on immoture.be; Calendar of events in the  mood disorders community; Latest News and Research; and [facility] [address] for [employer]. Remember - You are not alone; You are not to blame; With help, you will be well. 3-506-758-PPD(9626). WWW. POSTPARTUM. NET    · Writing or talking about death, such as writing suicide notes or talking about guns, knives, or pills. Keep the numbers for these national suicide hotlines: 0-560-097-TALK (8-261.672.1247) and 1-400-BIZYKVI (4-633.554.8144). If you or someone you know talks about suicide or feeling hopeless, get help right away. Constipation and Hemorrhoids  Drink plenty of fluids, enough so that your urine is light yellow or clear like water. If you have kidney, heart, or liver disease and have to limit fluids, talk with your doctor before you increase the amount of fluids you drink. · Eat plenty of fiber each day. Have a bran muffin or bran cereal for breakfast, and try eating a piece of fruit for a mid-afternoon snack. · For painful, itchy hemorrhoids, put ice or a cold pack on the area several times a day for 10 minutes at a time. Follow this by putting a warm compress on the area for another 10 to 20 minutes or by sitting in a shallow, warm bath. When should you call for help? Call 911 anytime you think you may need emergency care. For example, call if:  · You are thinking of hurting yourself, your baby, or anyone else. · You passed out (lost consciousness). · You have symptoms of a blood clot in your lung (called a pulmonary embolism). These may include:  · Sudden chest pain. · Trouble breathing. · Coughing up blood. Call your doctor now or seek immediate medical care if:  · You have severe vaginal bleeding. · You are soaking through a pad each hour for 2 or more hours.   · Your vaginal bleeding seems to be getting heavier or is still bright red 4 days after delivery. · You are dizzy or lightheaded, or you feel like you may faint. · You are vomiting or cannot keep fluids down. · You have a fever. · You have new or more belly pain. · You pass tissue (not just blood). · Your vaginal discharge smells bad. · Your belly feels tender or full and hard. · Your breasts are continuously painful or red. · You feel sad, anxious, or hopeless for more than a few days. · You have sudden, severe pain in your belly. · You have symptoms of a blood clot in your leg (called a deep vein thrombosis), such as:  · Pain in your calf, back of the knee, thigh, or groin. · Redness and swelling in your leg or groin. · You have symptoms of preeclampsia, such as:  · Sudden swelling of your face, hands, or feet. · New vision problems (such as dimness or blurring). · A severe headache. · Your blood pressure is higher than it should be or rises suddenly. · You have new nausea or vomiting. Watch closely for changes in your health, and be sure to contact your doctor if you have any problems. Additional Information:  Learning About Hypertensive Disorders After Childbirth    What is preeclampsia? A woman with preeclampsia has blood pressure that is higher than usual. She may also have other serious symptoms. Preeclampsia can be dangerous. When it is severe, it can cause seizures (eclampsia) or liver or kidney damage. When the liver is affected, some women get HELLP syndrome, a blood-clotting and bleeding problem. HELLP can come on quickly and can be deadly. This is why your doctor checks you and your baby often. Preeclampsia usually occurs after 20 weeks of pregnancy. In rare cases, it is first noted right after childbirth. Most often, it starts near the end of pregnancy and goes away after childbirth. What are the symptoms? Mild preeclampsia usually doesn't cause symptoms.  But preeclampsia can cause rapid weight gain and sudden swelling of the hands and face. Severe preeclampsia does cause symptoms. It can cause a very bad headache and trouble seeing and breathing. It also can cause belly pain. You may also urinate less than usual.    If you have new preeclampsia symptoms after you go home from the hospital, call your doctor right away. What can you expect after you have had preeclampsia? In the hospital  After the baby and the placenta are delivered, preeclampsia usually starts to improve. Most women get better in the first few days after childbirth. After having preeclampsia, you still have a risk of seizures for a day or more after childbirth. (Very rarely, seizures happen later on.) So your doctor may have you take magnesium sulfate for a day or more to prevent seizures. You may also take medicine to lower your blood pressure. When you go home  Your blood pressure will most likely return to normal a few days after delivery. Your doctor will want to check your blood pressure sometime in the first week after you leave the hospital.    Some women still have high blood pressure 6 weeks after childbirth. But most return to normal levels over the long term. · Take and record your blood pressure at home if your doctor tells you to. · Learn the importance of the two measures of blood pressure (such as 120 over 80, or 120/80). The first number is the systolic pressure. This is the force of blood on the artery walls as the heart pumps. The second number is the diastolic pressure. This is the force of blood on the artery walls between heartbeats, when the heart is at rest. You have a choice of monitors to use. Manual monitor: You pump up the cuff and use a stethoscope to listen for your  Pulse. · Electronic monitor: The cuff inflates, and a gauge shows your pulse rate. · To take your blood pressure:  · Ask your doctor to check your blood pressure monitor to be sure that it is accurate and that the cuff fits you.  Also ask your doctor to watch you use it, to make sure that you are using it right. · You should not eat, use tobacco products, or use medicine known to raise blood pressure (such as some nasal decongestant sprays) before you take your blood pressure. · Avoid taking your blood pressure if you have just exercised or are nervous or upset. Rest at least 15 minutes before you take your blood pressure. · Be safe with medicines. If you take medicine, take it exactly as prescribed. Call your doctor if you think you are having a problem with your medicine. · Do not smoke. Quitting smoking will help lower your blood pressure and improve your baby's growth and health. If you need help quitting, talk to your doctor about stop-smoking programs and medicines. These can increase your chances of quitting for good. · Eat a balanced and healthy diet that has lots of fruits and vegetables. Long-term health   After you have had preeclampsia, you have a higher-than-average risk of heart disease, stroke, and kidney disease. This may be because the same things that cause preeclampsia also cause heart and kidney disease. To protect your health, work with your doctor on living a heart-healthy lifestyle and getting the checkups you need. Your doctor may also want you to check your blood pressure at home. Follow-up care is a key part of your treatment and safety. Be sure to make and go to all appointments, and call your doctor if you are having problems. It's also a good idea to know your test results and keep a list of the medicines you take. Preventing Infection at Home    We care about preventing infection and avoiding the spread of germs - not only when you are in the hospital but also when you return home. When you return home from the hospital, it's important to take the following steps to help prevent infection and avoid spreading germs that could infect you and others.  Ask everyone in your home to follow these guidelines, too.    [address]  · Clean your hands whenever your hands are visibly dirty, before you eat, before or after touching your mouth, nose or eyes, and before preparing food. Clean them after contact with body fluids, using the restroom, touching animals or changing diapers. · When washing hands, wet them with warm water and work up a lather. Rub hands for at least 15 seconds, then rinse them and pat them dry with a clean towel or paper towel. · When using hand sanitizers, it should take about 15 seconds to rub your hands dry. If not, you probably didn't apply enough . Cover Your Sneeze or Cough  Germs are released into the air whenever you sneeze or cough. To prevent the spread of infection:  · Turn away from other people before coughing or sneezing. · Cover your mouth or nose with a tissue when you cough or sneeze. Put the tissue in the trash. · If you don't have a tissue, cough or sneeze into your upper sleeve, not your hands. · Always clean your hands after coughing or sneezing. Care for Wounds  Your skin is your body's first line of defense against germs, but an open wound leaves an easy way for germs to enter your body. To prevent infection:  · Clean your hands before and after changing wound dressings, and wear gloves to change dressings if recommended by your doctor. · Take special care with IV lines or other devices inserted into the body. If you must touch them, clean your hands first.  · Follow any specific instructions from your doctor to care for your wounds. Contact your doctor if you experience any signs of infection, such as fever or increased redness at the surgical or wound site. Keep a Clean Home  · Clean or wipe commonly touched hard surfaces like door handles, sinks, tabletops, phones and TV remotes. · Use products labeled \"disinfectant\" to kill harmful bacteria and viruses. · Use a clean cloth or paper towel to clean and dry surfaces.  Wiping surfaces with a dirty dishcloth, sponge or towel will only spread germs. · Never share toothbrushes, lakhani, drinking glasses, utensils, razor blades, face cloths or bath towels to avoid spreading germs. · Be sure that the linens that you sleep on are clean. · Keep pets away from wounds and wash your hands after touching pets, their toys or bedding. We care about you and your health. Remember, preventing infections is a   team effort between you, your family, friends and health care providers. Postpartum Support    PARENTS:  Are you feeling sad or depressed? Is it difficult for you to enjoy yourself? Do you feel more irritable or tense? Do you feel anxious or panicky? Are you having difficulty bonding with your baby? Do you feel as if you are \"out of control\" or \"going crazy\"? Are you worried that you might hurt your baby or yourself? FAMILIES: Do you worry that something is wrong but don't know how to help? Do you think that your partner or spouse is having problems coping? Are you worried that it may never get better? While many women experience some mild mood change or \"the blues\" during or after the birth of a child, 1 in 9 women experience more significant symptoms of depression or anxiety. 1 in 10 Dads become depressed during the first year. Things you can do  Being a good parent includes taking care of yourself. If you take care of yourself, you will be able to take better care of your baby and your family. · Talk to a counselor or healthcare provider who has training in  mood and anxiety problems. · Learn as much as you can about pregnancy and postpartum depression and anxiety. · Get support from family and friends. Ask for help when you need it. · Join a support group in your area or online. · Keep active by walking, stretching or whatever form of exercise helps you to feel better. · Get enough rest and time for yourself. · Eat a healthy diet. · Don't give up!  It may take more than one try to get the right help you need. These are general instructions for a healthy lifestyle:    No smoking/ No tobacco products/ Avoid exposure to second hand smoke    Surgeon General's Warning:  Quitting smoking now greatly reduces serious risk to your health. Obesity, smoking, and sedentary lifestyle greatly increases your risk for illness    A healthy diet, regular physical exercise & weight monitoring are important for maintaining a healthy lifestyle    Recognize signs and symptoms of STROKE:    F-face looks uneven    A-arms unable to move or move unevenly    S-speech slurred or non-existent    T-time-call 911 as soon as signs and symptoms begin - DO NOT go       back to bed or wait to see if you get better - TIME IS BRAIN. I have had the opportunity to make my options or choices for discharge. I have received and understand these instructions.

## 2020-10-04 NOTE — PROGRESS NOTES
Bedside shift change report given to Eduardo Zapata RN  (oncoming nurse) by AIDEN Morris RN (offgoing nurse). Report included the following information SBAR.

## 2020-10-04 NOTE — ROUTINE PROCESS
2000- Bedside shift change report given to AIDEN Fabian RN (oncoming nurse) by Savita Mark RN (offgoing nurse). Report included the following information SBAR.

## 2020-10-04 NOTE — PROGRESS NOTES
Post-Partum Day Number 2 Progress Note    Patient doing well post-partum without significant complaint. Voiding without difficulty, normal lochia. Denies HA, changes in vision, RUQ pain    Vitals:    Patient Vitals for the past 8 hrs:   BP Temp Pulse Resp   10/04/20 0443 120/74 98.1 °F (36.7 °C) 74 16     Temp (24hrs), Av.1 °F (36.7 °C), Min:98 °F (36.7 °C), Max:98.2 °F (36.8 °C)      Vital signs stable, afebrile. Exam:  Patient without distress. Abdomen soft, fundus firm at level of umbilicus, nontender                    Lower extremities are negative for swelling, cords or tenderness. Lab/Data Review: All lab results for the last 24 hours reviewed. Assessment and Plan:  Patient appears to be having uncomplicated post-partum course. Continue routine perineal care and maternal education. Plan discharge today. Pree without severe features: BPs normal to mild range s/p delivery. Asymptomatic, will continue to monitor.      Baby boy , no longer wants circ as inpatient  Rh neg, baby Rh positive, s/p Rhogam  Rimm      Hypothyroidism, home synthroid ordered    Jorge Luis Grossman MD   10/4/2020  8:50 AM

## 2020-10-04 NOTE — LACTATION NOTE
This note was copied from a baby's chart. Infant weight loss -5.4%. Per parents infant is biting less frequently and opening his mouth wider for feeding with deep latches. Encouraged parents to bring infant to them, use pillow supports, use intermittent hand massage to express milk and encourage suckling. Baby nursing well and has improved throughout post partum stay, deep latch maintained, mother is comfortable, milk is in transition, baby feeding vigorously with rhythmic suck, swallow, breathe pattern, with audible swallowing, and evident milk transfer, both breasts offerd, baby is asleep following feeding. Baby is feeding on demand, voiding and stools present as appropriate over the last 24 hours. Breasts may become engorged when milk \"comes in\". How milk is made / normal phases of milk production, supply and demand discussed. Taught care of engorged breasts - frequent breastfeeding encouraged, warm compresses and breast massage ac. Then nurse the baby or pump. Apply cold compresses pc x 15 minutes a few times a day for swelling or discomfort. May need to do this care for a couple of days. Discussed prevention and treatment of mastitis. Parents verbalizing increased confidence with infant feeding and deny further questions for lactation.

## 2022-03-18 PROBLEM — O13.9 GESTATIONAL HYPERTENSION: Status: ACTIVE | Noted: 2020-10-01

## 2022-03-19 PROBLEM — N63.13 BREAST LUMP ON RIGHT SIDE AT 8 O'CLOCK POSITION: Status: ACTIVE | Noted: 2017-10-30

## 2022-03-19 PROBLEM — Z3A.38 38 WEEKS GESTATION OF PREGNANCY: Status: ACTIVE | Noted: 2020-10-01

## 2022-03-20 PROBLEM — D24.9 FIBROADENOMA OF BREAST DETERMINED BY BIOPSY: Status: ACTIVE | Noted: 2017-10-30

## 2022-03-20 PROBLEM — R03.0 ELEVATED BP WITHOUT DIAGNOSIS OF HYPERTENSION: Status: ACTIVE | Noted: 2020-10-01

## 2023-05-20 RX ORDER — LEVOTHYROXINE SODIUM 0.07 MG/1
75 TABLET ORAL
COMMUNITY
Start: 2019-04-23

## 2023-05-20 RX ORDER — LEVOTHYROXINE SODIUM 0.15 MG/1
150 TABLET ORAL
COMMUNITY

## 2023-05-20 RX ORDER — ASCORBIC ACID 250 MG
250 TABLET ORAL DAILY
COMMUNITY

## 2023-05-20 RX ORDER — IBUPROFEN 600 MG/1
600 TABLET ORAL EVERY 6 HOURS PRN
COMMUNITY
Start: 2020-10-04